# Patient Record
Sex: FEMALE | Race: WHITE | NOT HISPANIC OR LATINO | Employment: UNEMPLOYED | URBAN - METROPOLITAN AREA
[De-identification: names, ages, dates, MRNs, and addresses within clinical notes are randomized per-mention and may not be internally consistent; named-entity substitution may affect disease eponyms.]

---

## 2019-07-16 PROBLEM — I25.10 CORONARY ARTERY DISEASE: Status: ACTIVE | Noted: 2019-07-16

## 2019-07-16 PROBLEM — Z72.0 TOBACCO USER: Status: ACTIVE | Noted: 2019-07-16

## 2021-07-13 ENCOUNTER — SPECIALTY PHARMACY (OUTPATIENT)
Dept: PHARMACY | Facility: CLINIC | Age: 61
End: 2021-07-13

## 2021-07-14 ENCOUNTER — SPECIALTY PHARMACY (OUTPATIENT)
Dept: PHARMACY | Facility: CLINIC | Age: 61
End: 2021-07-14

## 2021-07-14 DIAGNOSIS — M05.9 RHEUMATOID ARTHRITIS WITH POSITIVE RHEUMATOID FACTOR, INVOLVING UNSPECIFIED SITE: Primary | ICD-10-CM

## 2021-08-04 ENCOUNTER — TELEPHONE (OUTPATIENT)
Dept: PHARMACY | Facility: CLINIC | Age: 61
End: 2021-08-04

## 2021-08-09 ENCOUNTER — PATIENT MESSAGE (OUTPATIENT)
Dept: PHARMACY | Facility: CLINIC | Age: 61
End: 2021-08-09

## 2021-08-10 ENCOUNTER — SPECIALTY PHARMACY (OUTPATIENT)
Dept: PHARMACY | Facility: CLINIC | Age: 61
End: 2021-08-10

## 2021-09-14 ENCOUNTER — SPECIALTY PHARMACY (OUTPATIENT)
Dept: PHARMACY | Facility: CLINIC | Age: 61
End: 2021-09-14

## 2021-10-20 ENCOUNTER — SPECIALTY PHARMACY (OUTPATIENT)
Dept: PHARMACY | Facility: CLINIC | Age: 61
End: 2021-10-20

## 2021-10-22 ENCOUNTER — SPECIALTY PHARMACY (OUTPATIENT)
Dept: PHARMACY | Facility: CLINIC | Age: 61
End: 2021-10-22

## 2021-10-22 DIAGNOSIS — M05.9 RHEUMATOID ARTHRITIS WITH POSITIVE RHEUMATOID FACTOR, INVOLVING UNSPECIFIED SITE: Primary | ICD-10-CM

## 2021-11-15 ENCOUNTER — SPECIALTY PHARMACY (OUTPATIENT)
Dept: PHARMACY | Facility: CLINIC | Age: 61
End: 2021-11-15

## 2021-12-13 ENCOUNTER — SPECIALTY PHARMACY (OUTPATIENT)
Dept: PHARMACY | Facility: CLINIC | Age: 61
End: 2021-12-13

## 2022-01-12 ENCOUNTER — SPECIALTY PHARMACY (OUTPATIENT)
Dept: PHARMACY | Facility: CLINIC | Age: 62
End: 2022-01-12

## 2022-01-12 NOTE — TELEPHONE ENCOUNTER
Specialty Pharmacy - Refill Coordination    Specialty Medication Orders Linked to Encounter    Flowsheet Row Most Recent Value   Medication #1 tocilizumab (ACTEMRA) 162 mg/0.9 mL injection (Order#[id number], Rx#[id number])          Refill Questions - Documented Responses    Flowsheet Row Most Recent Value   Patient Availability and HIPAA Verification    Does patient want to proceed with activity? Yes   HIPAA/medical authority confirmed? Yes   Relationship to patient of person spoken to? Self   Refill Screening Questions    Changes to allergies? No   Changes to medications? No   New conditions since last clinic visit? No   Unplanned office visit, urgent care, ED, or hospital admission in the last 4 weeks? No   How does patient/caregiver feel medication is working? Good   Financial problems or insurance changes? No   How many doses of your specialty medications were missed in the last 4 weeks? 0   Would patient like to speak to a pharmacist? No   When does the patient need to receive the medication? 01/19/22   Refill Delivery Questions    How will the patient receive the medication? Mail   When does the patient need to receive the medication? 01/19/22   Shipping Address Home   Address in [facility] confirmed and updated if neccessary? Yes   Expected Copay ($) 0   Is the patient able to afford the medication copay? Yes   Payment Method zero copay   Days supply of Refill 28   Supplies needed? No supplies needed   Refill activity completed? Yes   Refill activity plan Refill scheduled   Shipment/Pickup Date: 01/18/22          Current Outpatient Medications   Medication Sig    albuterol (PROAIR HFA) 90 mcg/actuation inhaler as needed.    albuterol-ipratropium (DUO-NEB) 2.5 mg-0.5 mg/3 mL nebulizer solution ipratropium 0.5 mg-albuterol 3 mg (2.5 mg base)/3 mL nebulization soln   USE 1 AMPULE IN NEBULIZER EVERY 8 TO 12 HOURS AS NEEDED FOR COUGH    alendronate (FOSAMAX) 70 MG tablet Take 1 tablet (70 mg total) by  mouth every 7 days.    aspirin 81 MG Chew aspirin 81 mg chewable tablet   Chew 1 tablet every day by oral route for 30 days.    atorvastatin (LIPITOR) 40 MG tablet Take 40 mg by mouth once daily.    calcium-vitamin D 600 mg(1,500mg) -400 unit Tab Take by mouth once daily.    EPINEPHrine (EPIPEN) 0.3 mg/0.3 mL AtIn epinephrine 0.3 mg/0.3 mL injection, auto-injector   use AS DIRECTED    folic acid (FOLVITE) 1 MG tablet Take 1 tablet (1 mg total) by mouth once daily.    ibuprofen (ADVIL,MOTRIN) 800 MG tablet ibuprofen 800 mg tablet   TAKE ONE TABLET BY MOUTH DAILY AS NEEDED FOR PAIN    metoprolol tartrate (LOPRESSOR) 25 MG tablet Take 12.5 mg by mouth 2 (two) times daily.    multivitamin capsule Take 1 capsule by mouth once daily.    pantoprazole (PROTONIX) 40 MG tablet Take 1 tablet (40 mg total) by mouth once daily.    predniSONE (DELTASONE) 5 MG tablet Take 1 tablet (5 mg total) by mouth once daily.    tocilizumab (ACTEMRA) 162 mg/0.9 mL injection Inject 0.9 mLs (162 mg total) into the skin every 14 (fourteen) days.   Last reviewed on 10/20/2021 10:58 AM by Christiane Wyman MD    Review of patient's allergies indicates:   Allergen Reactions    Povidone-iodine Rash and Swelling    Venom-wasp Anaphylaxis    Last reviewed on  10/20/2021 10:58 AM by Christiane Wyman      Tasks added this encounter   No tasks added.   Tasks due within next 3 months   1/13/2022 - Clinical - Follow Up Assesement (90 day)  1/12/2022 - Refill Call (Auto Added)     Kulwant Barnett  [facility] - Specialty Pharmacy  [address] [place] [address]  Phone: [phone]  Fax: [phone]

## 2022-02-07 ENCOUNTER — SPECIALTY PHARMACY (OUTPATIENT)
Dept: PHARMACY | Facility: CLINIC | Age: 62
End: 2022-02-07

## 2022-02-07 NOTE — TELEPHONE ENCOUNTER
Specialty Pharmacy - Refill Coordination    Specialty Medication Orders Linked to Encounter    Flowsheet Row Most Recent Value   Medication #1 tocilizumab (ACTEMRA) 162 mg/0.9 mL injection (Order#[id number], Rx#[id number])          Refill Questions - Documented Responses    Flowsheet Row Most Recent Value   Patient Availability and HIPAA Verification    Does patient want to proceed with activity? Yes   HIPAA/medical authority confirmed? Yes   Relationship to patient of person spoken to? Self   Refill Screening Questions    Changes to allergies? No   Changes to medications? No   New conditions since last clinic visit? No   Unplanned office visit, urgent care, ED, or hospital admission in the last 4 weeks? No   How does patient/caregiver feel medication is working? Good   Financial problems or insurance changes? No   How many doses of your specialty medications were missed in the last 4 weeks? 0   Would patient like to speak to a pharmacist? No   When does the patient need to receive the medication? 02/16/22   Refill Delivery Questions    How will the patient receive the medication? Mail   When does the patient need to receive the medication? 02/16/22   Shipping Address Home   Address in [facility] confirmed and updated if neccessary? Yes   Expected Copay ($) 0   Is the patient able to afford the medication copay? Yes   Payment Method zero copay   Days supply of Refill 28   Supplies needed? No supplies needed   Refill activity completed? Yes   Refill activity plan Refill scheduled   Shipment/Pickup Date: 02/14/22          Current Outpatient Medications   Medication Sig    albuterol (PROAIR HFA) 90 mcg/actuation inhaler as needed.    albuterol-ipratropium (DUO-NEB) 2.5 mg-0.5 mg/3 mL nebulizer solution ipratropium 0.5 mg-albuterol 3 mg (2.5 mg base)/3 mL nebulization soln   USE 1 AMPULE IN NEBULIZER EVERY 8 TO 12 HOURS AS NEEDED FOR COUGH    alendronate (FOSAMAX) 70 MG tablet Take 1 tablet (70 mg total) by  mouth every 7 days.    aspirin 81 MG Chew aspirin 81 mg chewable tablet   Chew 1 tablet every day by oral route for 30 days.    atorvastatin (LIPITOR) 40 MG tablet Take 40 mg by mouth once daily.    calcium-vitamin D 600 mg(1,500mg) -400 unit Tab Take by mouth once daily.    EPINEPHrine (EPIPEN) 0.3 mg/0.3 mL AtIn epinephrine 0.3 mg/0.3 mL injection, auto-injector   use AS DIRECTED    folic acid (FOLVITE) 1 MG tablet Take 1 tablet (1 mg total) by mouth once daily.    ibuprofen (ADVIL,MOTRIN) 800 MG tablet ibuprofen 800 mg tablet   TAKE ONE TABLET BY MOUTH DAILY AS NEEDED FOR PAIN    metoprolol tartrate (LOPRESSOR) 25 MG tablet Take 12.5 mg by mouth 2 (two) times daily.    multivitamin capsule Take 1 capsule by mouth once daily.    pantoprazole (PROTONIX) 40 MG tablet Take 1 tablet (40 mg total) by mouth once daily.    predniSONE (DELTASONE) 5 MG tablet Take 1 tablet (5 mg total) by mouth once daily.    tocilizumab (ACTEMRA) 162 mg/0.9 mL injection Inject 0.9 mLs (162 mg total) into the skin every 14 (fourteen) days.   Last reviewed on 10/20/2021 10:58 AM by Christiane Wyman MD    Review of patient's allergies indicates:   Allergen Reactions    Povidone-iodine Rash and Swelling    Venom-wasp Anaphylaxis    Last reviewed on  2/7/2022 2:27 PM by Christiane Wyman      Tasks added this encounter   3/9/2022 - Refill Call (Auto Added)   Tasks due within next 3 months   No tasks due.     Kalee Enamorado  [facility] - Specialty Pharmacy  [address] [facility] [address]  Phone: [phone]  Fax: [phone]

## 2022-02-28 ENCOUNTER — SPECIALTY PHARMACY (OUTPATIENT)
Dept: PHARMACY | Facility: CLINIC | Age: 62
End: 2022-02-28

## 2022-02-28 NOTE — TELEPHONE ENCOUNTER
Patient called to schedule her Actemra refill. Patient has one dose left on hand for Wednesday and will not need a refill until 3/16. Patient wanted to get the dose early. RTS until 3/1/22. Scheduling refill call for 3/2 per patient's request.

## 2022-03-03 ENCOUNTER — SPECIALTY PHARMACY (OUTPATIENT)
Dept: PHARMACY | Facility: CLINIC | Age: 62
End: 2022-03-03

## 2022-03-09 NOTE — TELEPHONE ENCOUNTER
Specialty Pharmacy - Refill Coordination    Specialty Medication Orders Linked to Encounter    Flowsheet Row Most Recent Value   Medication #1 tocilizumab (ACTEMRA) 162 mg/0.9 mL injection (Order#[id number], Rx#[id number])          Refill Questions - Documented Responses    Flowsheet Row Most Recent Value   Patient Availability and HIPAA Verification    Does patient want to proceed with activity? Yes   HIPAA/medical authority confirmed? Yes   Relationship to patient of person spoken to? Self   Refill Screening Questions    Changes to allergies? No   Changes to medications? No   New conditions since last clinic visit? No   Unplanned office visit, urgent care, ED, or hospital admission in the last 4 weeks? No   How does patient/caregiver feel medication is working? Good   Financial problems or insurance changes? No   How many doses of your specialty medications were missed in the last 4 weeks? 0   Would patient like to speak to a pharmacist? No   When does the patient need to receive the medication? 03/15/22   Refill Delivery Questions    How will the patient receive the medication? Mail   When does the patient need to receive the medication? 03/15/22   Shipping Address Home   Address in [facility] confirmed and updated if neccessary? Yes   Expected Copay ($) 0   Is the patient able to afford the medication copay? Yes   Payment Method zero copay   Days supply of Refill 28   Supplies needed? No supplies needed   Refill activity completed? Yes   Refill activity plan Refill scheduled   Shipment/Pickup Date: 03/09/22          Current Outpatient Medications   Medication Sig    albuterol (PROAIR HFA) 90 mcg/actuation inhaler as needed.    albuterol-ipratropium (DUO-NEB) 2.5 mg-0.5 mg/3 mL nebulizer solution ipratropium 0.5 mg-albuterol 3 mg (2.5 mg base)/3 mL nebulization soln   USE 1 AMPULE IN NEBULIZER EVERY 8 TO 12 HOURS AS NEEDED FOR COUGH    alendronate (FOSAMAX) 70 MG tablet Take 1 tablet (70 mg total) by  mouth every 7 days.    aspirin 81 MG Chew aspirin 81 mg chewable tablet   Chew 1 tablet every day by oral route for 30 days.    atorvastatin (LIPITOR) 40 MG tablet Take 40 mg by mouth once daily.    calcium-vitamin D 600 mg(1,500mg) -400 unit Tab Take by mouth once daily.    EPINEPHrine (EPIPEN) 0.3 mg/0.3 mL AtIn epinephrine 0.3 mg/0.3 mL injection, auto-injector   use AS DIRECTED    folic acid (FOLVITE) 1 MG tablet Take 1 tablet (1 mg total) by mouth once daily.    ibuprofen (ADVIL,MOTRIN) 800 MG tablet ibuprofen 800 mg tablet   TAKE ONE TABLET BY MOUTH DAILY AS NEEDED FOR PAIN    metoprolol tartrate (LOPRESSOR) 25 MG tablet Take 12.5 mg by mouth 2 (two) times daily.    multivitamin capsule Take 1 capsule by mouth once daily.    pantoprazole (PROTONIX) 40 MG tablet Take 1 tablet (40 mg total) by mouth once daily.    predniSONE (DELTASONE) 5 MG tablet Take 1 tablet (5 mg total) by mouth once daily.    tocilizumab (ACTEMRA) 162 mg/0.9 mL injection Inject 0.9 mLs (162 mg total) into the skin every 14 (fourteen) days.   Last reviewed on 10/20/2021 10:58 AM by Christiane Wyman MD    Review of patient's allergies indicates:   Allergen Reactions    Povidone-iodine Rash and Swelling    Venom-wasp Anaphylaxis    Last reviewed on  2/7/2022 2:27 PM by Christiane Wyman      Tasks added this encounter   4/5/2022 - Refill Call (Auto Added)   Tasks due within next 3 months   No tasks due.     Gisele Perez, PharmD  [facility] - Specialty Pharmacy  [address] [place] [address]  Phone: [phone]  Fax: [phone]

## 2022-03-31 ENCOUNTER — SPECIALTY PHARMACY (OUTPATIENT)
Dept: PHARMACY | Facility: CLINIC | Age: 62
End: 2022-03-31

## 2022-03-31 NOTE — TELEPHONE ENCOUNTER
Spoke with patient. She stated she injected 3/30 and her next injection is 4/13. She is going out of town and would like to receive her ACTEMRA refill ASAP.

## 2022-04-02 NOTE — TELEPHONE ENCOUNTER
Specialty Pharmacy - Refill Coordination    Specialty Medication Orders Linked to Encounter    Flowsheet Row Most Recent Value   Medication #1 tocilizumab (ACTEMRA) 162 mg/0.9 mL injection (Order#[id number], Rx#[id number])          Refill Questions - Documented Responses    Flowsheet Row Most Recent Value   Patient Availability and HIPAA Verification    Does patient want to proceed with activity? Yes   HIPAA/medical authority confirmed? Yes   Relationship to patient of person spoken to? Self   Refill Screening Questions    Changes to allergies? No   Changes to medications? No   New conditions since last clinic visit? No   Unplanned office visit, urgent care, ED, or hospital admission in the last 4 weeks? No   How does patient/caregiver feel medication is working? Good   Financial problems or insurance changes? No   How many doses of your specialty medications were missed in the last 4 weeks? 0   Would patient like to speak to a pharmacist? No   When does the patient need to receive the medication? 04/12/22   Refill Delivery Questions    How will the patient receive the medication? Mail   When does the patient need to receive the medication? 04/12/22   Shipping Address Home   Address in [facility] confirmed and updated if neccessary? Yes   Expected Copay ($) 0   Is the patient able to afford the medication copay? Yes   Payment Method zero copay   Days supply of Refill 28   Supplies needed? No supplies needed   Refill activity completed? Yes   Refill activity plan Refill scheduled   Shipment/Pickup Date: 04/04/22          Current Outpatient Medications   Medication Sig    albuterol (PROVENTIL/VENTOLIN HFA) 90 mcg/actuation inhaler as needed.    albuterol-ipratropium (DUO-NEB) 2.5 mg-0.5 mg/3 mL nebulizer solution ipratropium 0.5 mg-albuterol 3 mg (2.5 mg base)/3 mL nebulization soln   USE 1 AMPULE IN NEBULIZER EVERY 8 TO 12 HOURS AS NEEDED FOR COUGH    alendronate (FOSAMAX) 70 MG tablet Take 1 tablet (70 mg  total) by mouth every 7 days.    aspirin 81 MG Chew aspirin 81 mg chewable tablet   Chew 1 tablet every day by oral route for 30 days.    atorvastatin (LIPITOR) 40 MG tablet Take 40 mg by mouth once daily.    calcium-vitamin D 600 mg(1,500mg) -400 unit Tab Take by mouth once daily.    EPINEPHrine (EPIPEN) 0.3 mg/0.3 mL AtIn epinephrine 0.3 mg/0.3 mL injection, auto-injector   use AS DIRECTED    folic acid (FOLVITE) 1 MG tablet Take 1 tablet (1 mg total) by mouth once daily.    ibuprofen (ADVIL,MOTRIN) 800 MG tablet ibuprofen 800 mg tablet   TAKE ONE TABLET BY MOUTH DAILY AS NEEDED FOR PAIN    metoprolol tartrate (LOPRESSOR) 25 MG tablet Take 12.5 mg by mouth 2 (two) times daily.    multivitamin capsule Take 1 capsule by mouth once daily.    pantoprazole (PROTONIX) 40 MG tablet Take 1 tablet (40 mg total) by mouth once daily.    predniSONE (DELTASONE) 5 MG tablet Take 1 tablet (5 mg total) by mouth once daily.    tocilizumab (ACTEMRA) 162 mg/0.9 mL injection Inject 0.9 mLs (162 mg total) into the skin every 14 (fourteen) days.   Last reviewed on 3/16/2022  9:14 AM by Lula Cornell MA    Review of patient's allergies indicates:   Allergen Reactions    Povidone-iodine Rash and Swelling    Venom-wasp Anaphylaxis    Last reviewed on  3/16/2022 9:14 AM by Lula Cornell      Tasks added this encounter   No tasks added.   Tasks due within next 3 months   3/31/2022 - Refill Call (Auto Added)     Kalee Dyer [facility] - Specialty Pharmacy  [address] [facility] [address]  Phone: [phone]  Fax: [phone]

## 2022-04-18 ENCOUNTER — PATIENT MESSAGE (OUTPATIENT)
Dept: ADMINISTRATIVE | Facility: OTHER | Age: 62
End: 2022-04-18

## 2022-05-04 ENCOUNTER — SPECIALTY PHARMACY (OUTPATIENT)
Dept: PHARMACY | Facility: CLINIC | Age: 62
End: 2022-05-04

## 2022-05-09 NOTE — TELEPHONE ENCOUNTER
Actemra PA submitted via [facility] Key: [id number] - PA Case ID: [id number]

## 2022-05-09 NOTE — TELEPHONE ENCOUNTER
Call from patient to check status of Actemra PA. Explained that medicaid had not yet reached a determination, advised patient that [facility] would follow up tomorrow.     Mekhi Grace, PharmD  Clinical Pharmacist  [facility]  P: [phone]

## 2022-05-10 RX ORDER — AZITHROMYCIN 250 MG/1
TABLET, FILM COATED ORAL
COMMUNITY
Start: 2022-04-27 | End: 2023-07-25

## 2022-05-10 NOTE — TELEPHONE ENCOUNTER
PA for Actemra APPROVED; $0 copay, BI/FA not required. Calling to set up refill.     Mekhi Grace, PharmD  Clinical Pharmacist  [facility]  P: [phone]

## 2022-05-10 NOTE — TELEPHONE ENCOUNTER
Specialty Pharmacy - Refill Coordination  Specialty Pharmacy - Medication/Referral Authorization    Specialty Medication Orders Linked to Encounter    Flowsheet Row Most Recent Value   Medication #1 tocilizumab (ACTEMRA) 162 mg/0.9 mL injection (Order#[id number], Rx#[id number])          Refill Questions - Documented Responses    Flowsheet Row Most Recent Value   Patient Availability and HIPAA Verification    Does patient want to proceed with activity? Yes   HIPAA/medical authority confirmed? Yes   Relationship to patient of person spoken to? Self   Refill Screening Questions    Changes to allergies? No   Changes to medications? No   New conditions since last clinic visit? No   Unplanned office visit, urgent care, ED, or hospital admission in the last 4 weeks? No   How does patient/caregiver feel medication is working? Excellent   Financial problems or insurance changes? No   How many doses of your specialty medications were missed in the last 4 weeks? 0   Would patient like to speak to a pharmacist? No   When does the patient need to receive the medication? 05/11/22   Refill Delivery Questions    How will the patient receive the medication? Mail   When does the patient need to receive the medication? 05/11/22   Shipping Address Home   Address in [facility] confirmed and updated if neccessary? Yes   Expected Copay ($) 0   Is the patient able to afford the medication copay? Yes   Payment Method zero copay   Days supply of Refill 28   Supplies needed? No supplies needed   Refill activity completed? Yes   Refill activity plan Refill scheduled   Shipment/Pickup Date: 05/10/22          Current Outpatient Medications   Medication Sig    albuterol (PROVENTIL/VENTOLIN HFA) 90 mcg/actuation inhaler as needed.    albuterol-ipratropium (DUO-NEB) 2.5 mg-0.5 mg/3 mL nebulizer solution ipratropium 0.5 mg-albuterol 3 mg (2.5 mg base)/3 mL nebulization soln   USE 1 AMPULE IN NEBULIZER EVERY 8 TO 12 HOURS AS NEEDED FOR COUGH     alendronate (FOSAMAX) 70 MG tablet Take 1 tablet (70 mg total) by mouth every 7 days.    aspirin 81 MG Chew aspirin 81 mg chewable tablet   Chew 1 tablet every day by oral route for 30 days.    atorvastatin (LIPITOR) 40 MG tablet Take 40 mg by mouth once daily.    azithromycin (Z-ERIC) 250 MG tablet Take by mouth.    calcium-vitamin D 600 mg(1,500mg) -400 unit Tab Take by mouth once daily.    EPINEPHrine (EPIPEN) 0.3 mg/0.3 mL AtIn epinephrine 0.3 mg/0.3 mL injection, auto-injector   use AS DIRECTED    folic acid (FOLVITE) 1 MG tablet Take 1 tablet (1 mg total) by mouth once daily.    ibuprofen (ADVIL,MOTRIN) 800 MG tablet ibuprofen 800 mg tablet   TAKE ONE TABLET BY MOUTH DAILY AS NEEDED FOR PAIN    metoprolol tartrate (LOPRESSOR) 25 MG tablet Take 12.5 mg by mouth 2 (two) times daily.    multivitamin capsule Take 1 capsule by mouth once daily.    pantoprazole (PROTONIX) 40 MG tablet Take 1 tablet (40 mg total) by mouth once daily.    predniSONE (DELTASONE) 5 MG tablet Take 1 tablet (5 mg total) by mouth once daily.    tocilizumab (ACTEMRA) 162 mg/0.9 mL injection Inject 0.9 mLs (162 mg total) into the skin every 14 (fourteen) days.   Last reviewed on 3/16/2022  9:14 AM by Lula Cornell MA    Review of patient's allergies indicates:   Allergen Reactions    Povidone-iodine Rash and Swelling    Venom-wasp Anaphylaxis    Last reviewed on  3/16/2022 9:14 AM by Lula Cornell      Tasks added this encounter   6/1/2022 - Refill Call (Auto Added)   Tasks due within next 3 months   No tasks due.     Mekhi Grace, PharmD  [facility] - Specialty Pharmacy  [address] [place] [address]  Phone: [phone]  Fax: [phone]

## 2022-06-06 ENCOUNTER — SPECIALTY PHARMACY (OUTPATIENT)
Dept: PHARMACY | Facility: CLINIC | Age: 62
End: 2022-06-06

## 2022-06-06 NOTE — TELEPHONE ENCOUNTER
Specialty Pharmacy - Refill Coordination    Specialty Medication Orders Linked to Encounter    Flowsheet Row Most Recent Value   Medication #1 tocilizumab (ACTEMRA) 162 mg/0.9 mL injection (Order#[id number], Rx#[id number])          Refill Questions - Documented Responses    Flowsheet Row Most Recent Value   Patient Availability and HIPAA Verification    Does patient want to proceed with activity? Yes   HIPAA/medical authority confirmed? Yes   Relationship to patient of person spoken to? Self   Refill Screening Questions    Changes to allergies? No   Changes to medications? No   New conditions since last clinic visit? No   Unplanned office visit, urgent care, ED, or hospital admission in the last 4 weeks? No   How does patient/caregiver feel medication is working? Good   Financial problems or insurance changes? No   How many doses of your specialty medications were missed in the last 4 weeks? 0   Would patient like to speak to a pharmacist? No   When does the patient need to receive the medication? 06/08/22   Refill Delivery Questions    How will the patient receive the medication? Mail   When does the patient need to receive the medication? 06/08/22   Shipping Address Home   Address in [facility] confirmed and updated if neccessary? Yes   Expected Copay ($) 0   Is the patient able to afford the medication copay? Yes   Payment Method zero copay   Days supply of Refill 28   Supplies needed? No supplies needed   Refill activity completed? Yes   Refill activity plan Refill scheduled   Shipment/Pickup Date: 06/06/22          Current Outpatient Medications   Medication Sig    albuterol (PROVENTIL/VENTOLIN HFA) 90 mcg/actuation inhaler as needed.    albuterol-ipratropium (DUO-NEB) 2.5 mg-0.5 mg/3 mL nebulizer solution ipratropium 0.5 mg-albuterol 3 mg (2.5 mg base)/3 mL nebulization soln   USE 1 AMPULE IN NEBULIZER EVERY 8 TO 12 HOURS AS NEEDED FOR COUGH    alendronate (FOSAMAX) 70 MG tablet Take 1 tablet (70 mg  total) by mouth every 7 days.    aspirin 81 MG Chew aspirin 81 mg chewable tablet   Chew 1 tablet every day by oral route for 30 days.    atorvastatin (LIPITOR) 40 MG tablet Take 40 mg by mouth once daily.    azithromycin (Z-ERIC) 250 MG tablet Take by mouth.    calcium-vitamin D 600 mg(1,500mg) -400 unit Tab Take by mouth once daily.    EPINEPHrine (EPIPEN) 0.3 mg/0.3 mL AtIn epinephrine 0.3 mg/0.3 mL injection, auto-injector   use AS DIRECTED    folic acid (FOLVITE) 1 MG tablet Take 1 tablet (1 mg total) by mouth once daily.    ibuprofen (ADVIL,MOTRIN) 800 MG tablet ibuprofen 800 mg tablet   TAKE ONE TABLET BY MOUTH DAILY AS NEEDED FOR PAIN    metoprolol tartrate (LOPRESSOR) 25 MG tablet Take 12.5 mg by mouth 2 (two) times daily.    multivitamin capsule Take 1 capsule by mouth once daily.    pantoprazole (PROTONIX) 40 MG tablet Take 1 tablet (40 mg total) by mouth once daily.    predniSONE (DELTASONE) 5 MG tablet Take 1 tablet (5 mg total) by mouth once daily.    tocilizumab (ACTEMRA) 162 mg/0.9 mL injection Inject 0.9 mLs (162 mg total) into the skin every 14 (fourteen) days.   Last reviewed on 3/16/2022  9:14 AM by Lula Cornell MA    Review of patient's allergies indicates:   Allergen Reactions    Povidone-iodine Rash and Swelling    Venom-wasp Anaphylaxis    Last reviewed on  3/16/2022 9:14 AM by Lula Cornell      Tasks added this encounter   No tasks added.   Tasks due within next 3 months   6/3/2022 - Refill Call (Auto Added)     Kulwant Barnett  [facility] - Specialty Pharmacy  [address] [place] [address]  Phone: [phone]  Fax: [phone]

## 2022-06-29 ENCOUNTER — SPECIALTY PHARMACY (OUTPATIENT)
Dept: PHARMACY | Facility: CLINIC | Age: 62
End: 2022-06-29

## 2022-06-29 NOTE — TELEPHONE ENCOUNTER
Incoming call regarding Actemra refill. Pt stated she is due to inject on 7/5/22. Informed pt her insurance will not go threw till 6/30/22. Pt stated she is going out of town and asked for me to rerun the script on 6/30/22 and set up her refill to go out for 7/5/22 so she will receive it on 7/6/22. I asked her the refill questions and will follow up on the rest on 6/30/22.

## 2022-06-30 ENCOUNTER — PATIENT MESSAGE (OUTPATIENT)
Dept: PHARMACY | Facility: CLINIC | Age: 62
End: 2022-06-30

## 2022-07-05 NOTE — TELEPHONE ENCOUNTER
Specialty Pharmacy - Refill Coordination    Specialty Medication Orders Linked to Encounter    Flowsheet Row Most Recent Value   Medication #1 tocilizumab (ACTEMRA) 162 mg/0.9 mL injection (Order#[id number], Rx#[id number])          Refill Questions - Documented Responses    Flowsheet Row Most Recent Value   Patient Availability and HIPAA Verification    Does patient want to proceed with activity? Yes   HIPAA/medical authority confirmed? Yes   Relationship to patient of person spoken to? Self   Refill Screening Questions    Changes to allergies? No   Changes to medications? No   New conditions since last clinic visit? No   Unplanned office visit, urgent care, ED, or hospital admission in the last 4 weeks? No   How does patient/caregiver feel medication is working? Good   Financial problems or insurance changes? No   How many doses of your specialty medications were missed in the last 4 weeks? 0   Would patient like to speak to a pharmacist? No   When does the patient need to receive the medication? 07/08/22   Refill Delivery Questions    How will the patient receive the medication? Mail   When does the patient need to receive the medication? 07/08/22   Shipping Address Home   Address in [facility] confirmed and updated if neccessary? Yes   Expected Copay ($) 0   Is the patient able to afford the medication copay? Yes   Payment Method zero copay   Days supply of Refill 28   Supplies needed? No supplies needed   Refill activity completed? Yes   Refill activity plan Refill scheduled   Shipment/Pickup Date: 07/05/22          Current Outpatient Medications   Medication Sig    albuterol (PROVENTIL/VENTOLIN HFA) 90 mcg/actuation inhaler as needed.    albuterol-ipratropium (DUO-NEB) 2.5 mg-0.5 mg/3 mL nebulizer solution ipratropium 0.5 mg-albuterol 3 mg (2.5 mg base)/3 mL nebulization soln   USE 1 AMPULE IN NEBULIZER EVERY 8 TO 12 HOURS AS NEEDED FOR COUGH    alendronate (FOSAMAX) 70 MG tablet Take 1 tablet (70 mg  total) by mouth every 7 days.    aspirin 81 MG Chew aspirin 81 mg chewable tablet   Chew 1 tablet every day by oral route for 30 days.    atorvastatin (LIPITOR) 40 MG tablet Take 40 mg by mouth once daily.    azithromycin (Z-ERIC) 250 MG tablet Take by mouth.    calcium-vitamin D 600 mg(1,500mg) -400 unit Tab Take by mouth once daily.    EPINEPHrine (EPIPEN) 0.3 mg/0.3 mL AtIn epinephrine 0.3 mg/0.3 mL injection, auto-injector   use AS DIRECTED    folic acid (FOLVITE) 1 MG tablet Take 1 tablet (1 mg total) by mouth once daily.    ibuprofen (ADVIL,MOTRIN) 800 MG tablet ibuprofen 800 mg tablet   TAKE ONE TABLET BY MOUTH DAILY AS NEEDED FOR PAIN    metoprolol tartrate (LOPRESSOR) 25 MG tablet Take 12.5 mg by mouth 2 (two) times daily.    multivitamin capsule Take 1 capsule by mouth once daily.    pantoprazole (PROTONIX) 40 MG tablet Take 1 tablet (40 mg total) by mouth once daily.    predniSONE (DELTASONE) 5 MG tablet Take 1 tablet (5 mg total) by mouth once daily.    tocilizumab (ACTEMRA) 162 mg/0.9 mL injection Inject 0.9 mLs (162 mg total) into the skin every 14 (fourteen) days.   Last reviewed on 3/16/2022  9:14 AM by Lula Cornell MA    Review of patient's allergies indicates:   Allergen Reactions    Povidone-iodine Rash and Swelling    Venom-wasp Anaphylaxis    Last reviewed on  3/16/2022 9:14 AM by Lula Cornell      Tasks added this encounter   7/29/2022 - Refill Call (Auto Added)   Tasks due within next 3 months   No tasks due.     Whit Ramirez  [facility] - Specialty Pharmacy  [address] [place] [address]  Phone: [phone]  Fax: [phone]

## 2022-07-29 ENCOUNTER — SPECIALTY PHARMACY (OUTPATIENT)
Dept: PHARMACY | Facility: CLINIC | Age: 62
End: 2022-07-29

## 2022-07-29 ENCOUNTER — PATIENT MESSAGE (OUTPATIENT)
Dept: PHARMACY | Facility: CLINIC | Age: 62
End: 2022-07-29

## 2022-07-29 NOTE — TELEPHONE ENCOUNTER
Specialty Pharmacy - Refill Coordination    Specialty Medication Orders Linked to Encounter    Flowsheet Row Most Recent Value   Medication #1 tocilizumab (ACTEMRA) 162 mg/0.9 mL injection (Order#[id number], Rx#[id number])          Refill Questions - Documented Responses    Flowsheet Row Most Recent Value   Patient Availability and HIPAA Verification    Does patient want to proceed with activity? Yes   HIPAA/medical authority confirmed? Yes   Relationship to patient of person spoken to? Self   Refill Screening Questions    Changes to allergies? No   Changes to medications? No   New conditions since last clinic visit? No   Unplanned office visit, urgent care, ED, or hospital admission in the last 4 weeks? No   How does patient/caregiver feel medication is working? Good   Financial problems or insurance changes? No   How many doses of your specialty medications were missed in the last 4 weeks? 0   Would patient like to speak to a pharmacist? No   When does the patient need to receive the medication? 08/03/22   Refill Delivery Questions    How will the patient receive the medication? Mail   When does the patient need to receive the medication? 08/03/22   Shipping Address Home   Address in [facility] confirmed and updated if neccessary? Yes   Expected Copay ($) 0   Is the patient able to afford the medication copay? Yes   Payment Method zero copay   Days supply of Refill 28   Supplies needed? No supplies needed   Refill activity completed? Yes   Refill activity plan Refill scheduled   Shipment/Pickup Date: 08/01/22          Current Outpatient Medications   Medication Sig    albuterol (PROVENTIL/VENTOLIN HFA) 90 mcg/actuation inhaler as needed.    albuterol-ipratropium (DUO-NEB) 2.5 mg-0.5 mg/3 mL nebulizer solution ipratropium 0.5 mg-albuterol 3 mg (2.5 mg base)/3 mL nebulization soln   USE 1 AMPULE IN NEBULIZER EVERY 8 TO 12 HOURS AS NEEDED FOR COUGH    alendronate (FOSAMAX) 70 MG tablet Take 1 tablet (70 mg  total) by mouth every 7 days.    aspirin 81 MG Chew aspirin 81 mg chewable tablet   Chew 1 tablet every day by oral route for 30 days.    atorvastatin (LIPITOR) 40 MG tablet Take 40 mg by mouth once daily.    azithromycin (Z-ERIC) 250 MG tablet Take by mouth.    calcium-vitamin D 600 mg(1,500mg) -400 unit Tab Take by mouth once daily.    EPINEPHrine (EPIPEN) 0.3 mg/0.3 mL AtIn epinephrine 0.3 mg/0.3 mL injection, auto-injector   use AS DIRECTED    folic acid (FOLVITE) 1 MG tablet Take 1 tablet (1 mg total) by mouth once daily.    ibuprofen (ADVIL,MOTRIN) 800 MG tablet ibuprofen 800 mg tablet   TAKE ONE TABLET BY MOUTH DAILY AS NEEDED FOR PAIN    metoprolol tartrate (LOPRESSOR) 25 MG tablet Take 12.5 mg by mouth 2 (two) times daily.    multivitamin capsule Take 1 capsule by mouth once daily.    pantoprazole (PROTONIX) 40 MG tablet Take 1 tablet (40 mg total) by mouth once daily.    predniSONE (DELTASONE) 5 MG tablet Take 1 tablet (5 mg total) by mouth once daily.    tocilizumab (ACTEMRA) 162 mg/0.9 mL injection Inject 0.9 mLs (162 mg total) into the skin every 14 (fourteen) days.   Last reviewed on 3/16/2022  9:14 AM by Lula Cornell MA    Review of patient's allergies indicates:   Allergen Reactions    Povidone-iodine Rash and Swelling    Venom-wasp Anaphylaxis    Last reviewed on  3/16/2022 9:14 AM by Lula Cornell      Tasks added this encounter   No tasks added.   Tasks due within next 3 months   7/29/2022 - Refill Call (Auto Added)     Kulwant Barnett  [facility] - Specialty Pharmacy  [address] [place] [address]  Phone: [phone]  Fax: [phone]

## 2022-08-24 ENCOUNTER — SPECIALTY PHARMACY (OUTPATIENT)
Dept: PHARMACY | Facility: CLINIC | Age: 62
End: 2022-08-24

## 2022-08-24 DIAGNOSIS — M05.9 RHEUMATOID ARTHRITIS WITH POSITIVE RHEUMATOID FACTOR, INVOLVING UNSPECIFIED SITE: Primary | ICD-10-CM

## 2022-08-24 NOTE — TELEPHONE ENCOUNTER
Specialty Pharmacy - Clinical Reassessment    Specialty Medication Orders Linked to Encounter    Flowsheet Row Most Recent Value   Medication #1 tocilizumab (ACTEMRA) 162 mg/0.9 mL injection (Order#[id number], Rx#[id number])        Patient Diagnosis   M06.9 - Rheumatoid arthritis    Specialty clinical pharmacist review completed for an annual review of reassessment. Reviewed the following areas: current med list, reports of adverse effects, adherence and progress towards therapeutic goals.    Recommendations: none at this time.    Tasks added this encounter   5/24/2023 - Clinical - Follow Up Assesement (Annual)   Tasks due within next 3 months   8/24/2022 - Refill Call (Auto Added)     Kelly Mckee, PharmD  [facility] - Specialty Pharm[facility]  [address] [facility] [address]  Phone: [phone]  Fax: [phone]

## 2022-08-24 NOTE — TELEPHONE ENCOUNTER
Specialty Pharmacy - Refill Coordination    Specialty Medication Orders Linked to Encounter    Flowsheet Row Most Recent Value   Medication #1 tocilizumab (ACTEMRA) 162 mg/0.9 mL injection (Order#[id number], Rx#[id number])          Refill Questions - Documented Responses    Flowsheet Row Most Recent Value   Patient Availability and HIPAA Verification    Does patient want to proceed with activity? Yes   HIPAA/medical authority confirmed? Yes   Relationship to patient of person spoken to? Self   Refill Screening Questions    Changes to allergies? No   Changes to medications? No   New conditions since last clinic visit? No   Unplanned office visit, urgent care, ED, or hospital admission in the last 4 weeks? No   How does patient/caregiver feel medication is working? Good   Financial problems or insurance changes? No   How many doses of your specialty medications were missed in the last 4 weeks? 0   Would patient like to speak to a pharmacist? No   When does the patient need to receive the medication? 08/31/22   Refill Delivery Questions    How will the patient receive the medication? Mail   When does the patient need to receive the medication? 08/31/22   Shipping Address Home   Address in [facility] confirmed and updated if neccessary? Yes   Expected Copay ($) 0   Is the patient able to afford the medication copay? Yes   Payment Method zero copay   Days supply of Refill 28   Supplies needed? No supplies needed   Refill activity completed? Yes   Refill activity plan Refill scheduled   Shipment/Pickup Date: 08/25/22          Current Outpatient Medications   Medication Sig    albuterol (PROVENTIL/VENTOLIN HFA) 90 mcg/actuation inhaler as needed.    albuterol-ipratropium (DUO-NEB) 2.5 mg-0.5 mg/3 mL nebulizer solution ipratropium 0.5 mg-albuterol 3 mg (2.5 mg base)/3 mL nebulization soln   USE 1 AMPULE IN NEBULIZER EVERY 8 TO 12 HOURS AS NEEDED FOR COUGH    alendronate (FOSAMAX) 70 MG tablet Take 1 tablet (70 mg  total) by mouth every 7 days.    aspirin 81 MG Chew aspirin 81 mg chewable tablet   Chew 1 tablet every day by oral route for 30 days.    atorvastatin (LIPITOR) 40 MG tablet Take 40 mg by mouth once daily.    azithromycin (Z-ERIC) 250 MG tablet Take by mouth.    calcium-vitamin D 600 mg(1,500mg) -400 unit Tab Take by mouth once daily.    EPINEPHrine (EPIPEN) 0.3 mg/0.3 mL AtIn epinephrine 0.3 mg/0.3 mL injection, auto-injector   use AS DIRECTED    folic acid (FOLVITE) 1 MG tablet Take 1 tablet (1 mg total) by mouth once daily.    ibuprofen (ADVIL,MOTRIN) 800 MG tablet ibuprofen 800 mg tablet   TAKE ONE TABLET BY MOUTH DAILY AS NEEDED FOR PAIN    metoprolol tartrate (LOPRESSOR) 25 MG tablet Take 12.5 mg by mouth 2 (two) times daily.    multivitamin capsule Take 1 capsule by mouth once daily.    pantoprazole (PROTONIX) 40 MG tablet Take 1 tablet (40 mg total) by mouth once daily.    predniSONE (DELTASONE) 5 MG tablet Take 1 tablet (5 mg total) by mouth once daily.    tocilizumab (ACTEMRA) 162 mg/0.9 mL injection Inject 0.9 mLs (162 mg total) into the skin every 14 (fourteen) days.   Last reviewed on 3/16/2022  9:14 AM by Lula Cornell MA    Review of patient's allergies indicates:   Allergen Reactions    Povidone-iodine Rash and Swelling    Venom-wasp Anaphylaxis    Last reviewed on  3/16/2022 9:14 AM by Lula Cornell      Tasks added this encounter   9/21/2022 - Refill Call (Auto Added)   Tasks due within next 3 months   No tasks due.     Whit Ramirez  [facility] - Specialty Pharmacy  [address] [place] [id number]  Phone: [phone]  Fax: [phone]

## 2022-09-23 ENCOUNTER — SPECIALTY PHARMACY (OUTPATIENT)
Dept: PHARMACY | Facility: CLINIC | Age: 62
End: 2022-09-23

## 2022-09-23 NOTE — TELEPHONE ENCOUNTER
Incoming call from patient regarding Actemra. Refill request send to [facility]. Patient due to inject 9/28. Informed patient [facility] will reach out once refill received.

## 2022-09-27 NOTE — TELEPHONE ENCOUNTER
Specialty Pharmacy - Refill Coordination    Specialty Medication Orders Linked to Encounter      Flowsheet Row Most Recent Value   Medication #1 tocilizumab (ACTEMRA) 162 mg/0.9 mL injection (Order#[id number], Rx#[id number])            Refill Questions - Documented Responses      Flowsheet Row Most Recent Value   Patient Availability and HIPAA Verification    Does patient want to proceed with activity? Yes   HIPAA/medical authority confirmed? Yes   Relationship to patient of person spoken to? Self   Refill Screening Questions    Changes to allergies? No   Changes to medications? No   New conditions since last clinic visit? No   Unplanned office visit, urgent care, ED, or hospital admission in the last 4 weeks? No   How does patient/caregiver feel medication is working? Good   Financial problems or insurance changes? No   How many doses of your specialty medications were missed in the last 4 weeks? 0   Would patient like to speak to a pharmacist? No   When does the patient need to receive the medication? 09/28/22   Refill Delivery Questions    How will the patient receive the medication? Mail   When does the patient need to receive the medication? 09/28/22   Shipping Address Home   Address in [facility] confirmed and updated if neccessary? Yes   Expected Copay ($) 0   Is the patient able to afford the medication copay? Yes   Payment Method zero copay   Days supply of Refill 28   Supplies needed? No supplies needed   Refill activity completed? Yes   Refill activity plan Refill scheduled   Shipment/Pickup Date: 09/27/22            Current Outpatient Medications   Medication Sig    albuterol (PROVENTIL/VENTOLIN HFA) 90 mcg/actuation inhaler as needed.    albuterol-ipratropium (DUO-NEB) 2.5 mg-0.5 mg/3 mL nebulizer solution ipratropium 0.5 mg-albuterol 3 mg (2.5 mg base)/3 mL nebulization soln   USE 1 AMPULE IN NEBULIZER EVERY 8 TO 12 HOURS AS NEEDED FOR COUGH    alendronate (FOSAMAX) 70 MG tablet Take 1 tablet  (70 mg total) by mouth every 7 days.    aspirin 81 MG Chew aspirin 81 mg chewable tablet   Chew 1 tablet every day by oral route for 30 days.    atorvastatin (LIPITOR) 40 MG tablet Take 40 mg by mouth once daily.    azithromycin (Z-ERIC) 250 MG tablet Take by mouth.    calcium-vitamin D 600 mg(1,500mg) -400 unit Tab Take by mouth once daily.    EPINEPHrine (EPIPEN) 0.3 mg/0.3 mL AtIn epinephrine 0.3 mg/0.3 mL injection, auto-injector   use AS DIRECTED    folic acid (FOLVITE) 1 MG tablet Take 1 tablet (1 mg total) by mouth once daily.    ibuprofen (ADVIL,MOTRIN) 800 MG tablet ibuprofen 800 mg tablet   TAKE ONE TABLET BY MOUTH DAILY AS NEEDED FOR PAIN    metoprolol tartrate (LOPRESSOR) 25 MG tablet Take 12.5 mg by mouth 2 (two) times daily.    multivitamin capsule Take 1 capsule by mouth once daily.    pantoprazole (PROTONIX) 40 MG tablet Take 1 tablet (40 mg total) by mouth once daily.    predniSONE (DELTASONE) 5 MG tablet Take 1 tablet (5 mg total) by mouth once daily.    tocilizumab (ACTEMRA) 162 mg/0.9 mL injection Inject 0.9 mLs (162 mg total) into the skin every 14 (fourteen) days.   Last reviewed on 3/16/2022  9:14 AM by Lula Cornell MA    Review of patient's allergies indicates:   Allergen Reactions    Povidone-iodine Rash and Swelling    Venom-wasp Anaphylaxis    Last reviewed on  3/16/2022 9:14 AM by Lula Cornell      Tasks added this encounter   10/19/2022 - Refill Call (Auto Added)   Tasks due within next 3 months   No tasks due.     Afia Dyer [facility] - Specialty Pharmacy  [address] [place] [id number]  Phone: [phone]  Fax: [phone]

## 2022-10-24 ENCOUNTER — SPECIALTY PHARMACY (OUTPATIENT)
Dept: PHARMACY | Facility: CLINIC | Age: 62
End: 2022-10-24

## 2022-10-24 NOTE — TELEPHONE ENCOUNTER
Specialty Pharmacy - Refill Coordination    Specialty Medication Orders Linked to Encounter      Flowsheet Row Most Recent Value   Medication #1 tocilizumab (ACTEMRA) 162 mg/0.9 mL injection (Order#[id number], Rx#[id number])            Refill Questions - Documented Responses      Flowsheet Row Most Recent Value   Patient Availability and HIPAA Verification    Does patient want to proceed with activity? Yes   HIPAA/medical authority confirmed? Yes   Relationship to patient of person spoken to? Self   Refill Screening Questions    Changes to allergies? No   Changes to medications? No   New conditions since last clinic visit? No   Unplanned office visit, urgent care, ED, or hospital admission in the last 4 weeks? No   How does patient/caregiver feel medication is working? Good   Financial problems or insurance changes? No   How many doses of your specialty medications were missed in the last 4 weeks? 0   Would patient like to speak to a pharmacist? No   When does the patient need to receive the medication? 10/26/22   Refill Delivery Questions    How will the patient receive the medication? MEDRx   When does the patient need to receive the medication? 10/26/22   Shipping Address Home   Address in [facility] confirmed and updated if neccessary? Yes   Expected Copay ($) 0   Is the patient able to afford the medication copay? Yes   Payment Method zero copay   Days supply of Refill 28   Supplies needed? No supplies needed   Refill activity completed? Yes   Refill activity plan Refill scheduled   Shipment/Pickup Date: 10/25/22            Current Outpatient Medications   Medication Sig    albuterol (PROVENTIL/VENTOLIN HFA) 90 mcg/actuation inhaler as needed.    albuterol-ipratropium (DUO-NEB) 2.5 mg-0.5 mg/3 mL nebulizer solution ipratropium 0.5 mg-albuterol 3 mg (2.5 mg base)/3 mL nebulization soln   USE 1 AMPULE IN NEBULIZER EVERY 8 TO 12 HOURS AS NEEDED FOR COUGH    alendronate (FOSAMAX) 70 MG tablet Take 1 tablet  (70 mg total) by mouth every 7 days.    aspirin 81 MG Chew aspirin 81 mg chewable tablet   Chew 1 tablet every day by oral route for 30 days.    atorvastatin (LIPITOR) 40 MG tablet Take 40 mg by mouth once daily.    azithromycin (Z-ERIC) 250 MG tablet Take by mouth.    calcium-vitamin D 600 mg(1,500mg) -400 unit Tab Take by mouth once daily.    EPINEPHrine (EPIPEN) 0.3 mg/0.3 mL AtIn epinephrine 0.3 mg/0.3 mL injection, auto-injector   use AS DIRECTED    folic acid (FOLVITE) 1 MG tablet Take 1 tablet (1 mg total) by mouth once daily.    ibuprofen (ADVIL,MOTRIN) 800 MG tablet ibuprofen 800 mg tablet   TAKE ONE TABLET BY MOUTH DAILY AS NEEDED FOR PAIN    metoprolol tartrate (LOPRESSOR) 25 MG tablet Take 12.5 mg by mouth 2 (two) times daily.    multivitamin capsule Take 1 capsule by mouth once daily.    pantoprazole (PROTONIX) 40 MG tablet Take 1 tablet (40 mg total) by mouth once daily.    predniSONE (DELTASONE) 5 MG tablet Take 1 tablet (5 mg total) by mouth once daily.    tocilizumab (ACTEMRA) 162 mg/0.9 mL injection Inject 0.9 mLs (162 mg total) into the skin every 14 (fourteen) days.   Last reviewed on 3/16/2022  9:14 AM by Lula Cornell MA    Review of patient's allergies indicates:   Allergen Reactions    Povidone-iodine Rash and Swelling    Venom-wasp Anaphylaxis    Last reviewed on  3/16/2022 9:14 AM by Lula Cornell      Tasks added this encounter   11/16/2022 - Refill Call (Auto Added)   Tasks due within next 3 months   No tasks due.     Jerri Prieto, PharmD  [facility]  [address] [place] [address]  Phone: [phone]  Fax: [phone]

## 2022-11-17 ENCOUNTER — SPECIALTY PHARMACY (OUTPATIENT)
Dept: PHARMACY | Facility: CLINIC | Age: 62
End: 2022-11-17

## 2022-11-17 NOTE — TELEPHONE ENCOUNTER
Specialty Pharmacy - Refill Coordination    Specialty Medication Orders Linked to Encounter      Flowsheet Row Most Recent Value   Medication #1 tocilizumab (ACTEMRA) 162 mg/0.9 mL injection (Order#[id number], Rx#[id number])            Refill Questions - Documented Responses      Flowsheet Row Most Recent Value   Patient Availability and HIPAA Verification    Does patient want to proceed with activity? Yes   HIPAA/medical authority confirmed? Yes   Relationship to patient of person spoken to? Self   Refill Screening Questions    Changes to allergies? No   Changes to medications? No   New conditions since last clinic visit? No   Unplanned office visit, urgent care, ED, or hospital admission in the last 4 weeks? No   How does patient/caregiver feel medication is working? Good   Financial problems or insurance changes? No   How many doses of your specialty medications were missed in the last 4 weeks? 0   Would patient like to speak to a pharmacist? No   When does the patient need to receive the medication? 11/23/22   Refill Delivery Questions    How will the patient receive the medication? Mail   When does the patient need to receive the medication? 11/23/22   Shipping Address Home   Address in [facility] confirmed and updated if neccessary? Yes   Expected Copay ($) 0   Is the patient able to afford the medication copay? Yes   Payment Method zero copay   Days supply of Refill 28   Supplies needed? No supplies needed   Refill activity completed? Yes   Refill activity plan Refill scheduled   Shipment/Pickup Date: 11/21/22            Current Outpatient Medications   Medication Sig    albuterol (PROVENTIL/VENTOLIN HFA) 90 mcg/actuation inhaler as needed.    albuterol-ipratropium (DUO-NEB) 2.5 mg-0.5 mg/3 mL nebulizer solution ipratropium 0.5 mg-albuterol 3 mg (2.5 mg base)/3 mL nebulization soln   USE 1 AMPULE IN NEBULIZER EVERY 8 TO 12 HOURS AS NEEDED FOR COUGH    alendronate (FOSAMAX) 70 MG tablet Take 1 tablet  (70 mg total) by mouth every 7 days.    aspirin 81 MG Chew aspirin 81 mg chewable tablet   Chew 1 tablet every day by oral route for 30 days.    atorvastatin (LIPITOR) 40 MG tablet Take 40 mg by mouth once daily.    azithromycin (Z-ERIC) 250 MG tablet Take by mouth.    calcium-vitamin D 600 mg(1,500mg) -400 unit Tab Take by mouth once daily.    EPINEPHrine (EPIPEN) 0.3 mg/0.3 mL AtIn epinephrine 0.3 mg/0.3 mL injection, auto-injector   use AS DIRECTED    folic acid (FOLVITE) 1 MG tablet Take 1 tablet (1 mg total) by mouth once daily.    ibuprofen (ADVIL,MOTRIN) 800 MG tablet ibuprofen 800 mg tablet   TAKE ONE TABLET BY MOUTH DAILY AS NEEDED FOR PAIN    metoprolol tartrate (LOPRESSOR) 25 MG tablet Take 12.5 mg by mouth 2 (two) times daily.    multivitamin capsule Take 1 capsule by mouth once daily.    pantoprazole (PROTONIX) 40 MG tablet Take 1 tablet (40 mg total) by mouth once daily.    predniSONE (DELTASONE) 5 MG tablet Take 1 tablet (5 mg total) by mouth once daily.    tocilizumab (ACTEMRA) 162 mg/0.9 mL injection Inject 0.9 mLs (162 mg total) into the skin every 14 (fourteen) days.   Last reviewed on 3/16/2022  9:14 AM by Lula Cornell MA    Review of patient's allergies indicates:   Allergen Reactions    Povidone-iodine Rash and Swelling    Venom-wasp Anaphylaxis    Last reviewed on  3/16/2022 9:14 AM by Lula Cornell      Tasks added this encounter   12/14/2022 - Refill Call (Auto Added)   Tasks due within next 3 months   No tasks due.     [facility] Dyer [facility] - Specialty Pharmacy  [address] [place] [id number]  Phone: [phone]  Fax: [phone]

## 2022-12-14 ENCOUNTER — SPECIALTY PHARMACY (OUTPATIENT)
Dept: PHARMACY | Facility: CLINIC | Age: 62
End: 2022-12-14

## 2022-12-14 NOTE — TELEPHONE ENCOUNTER
Specialty Pharmacy - Refill Coordination    Specialty Medication Orders Linked to Encounter      Flowsheet Row Most Recent Value   Medication #1 tocilizumab (ACTEMRA) 162 mg/0.9 mL injection (Order#[id number], Rx#[id number])            Refill Questions - Documented Responses      Flowsheet Row Most Recent Value   Patient Availability and HIPAA Verification    Does patient want to proceed with activity? Yes   HIPAA/medical authority confirmed? Yes   Relationship to patient of person spoken to? Self   Refill Screening Questions    Changes to allergies? No   Changes to medications? No   New conditions since last clinic visit? No   Unplanned office visit, urgent care, ED, or hospital admission in the last 4 weeks? No   How does patient/caregiver feel medication is working? Good   Financial problems or insurance changes? No   How many doses of your specialty medications were missed in the last 4 weeks? 0   Would patient like to speak to a pharmacist? No   When does the patient need to receive the medication? 12/21/22   Refill Delivery Questions    How will the patient receive the medication? Mail   When does the patient need to receive the medication? 12/21/22   Shipping Address Home   Address in [facility] confirmed and updated if neccessary? Yes   Expected Copay ($) 0   Is the patient able to afford the medication copay? Yes   Payment Method zero copay   Days supply of Refill 28   Supplies needed? No supplies needed   Refill activity completed? Yes   Refill activity plan Refill scheduled   Shipment/Pickup Date: 12/19/22            Current Outpatient Medications   Medication Sig    albuterol (PROVENTIL/VENTOLIN HFA) 90 mcg/actuation inhaler as needed.    albuterol-ipratropium (DUO-NEB) 2.5 mg-0.5 mg/3 mL nebulizer solution ipratropium 0.5 mg-albuterol 3 mg (2.5 mg base)/3 mL nebulization soln   USE 1 AMPULE IN NEBULIZER EVERY 8 TO 12 HOURS AS NEEDED FOR COUGH    alendronate (FOSAMAX) 70 MG tablet Take 1 tablet  (70 mg total) by mouth every 7 days.    aspirin 81 MG Chew aspirin 81 mg chewable tablet   Chew 1 tablet every day by oral route for 30 days.    atorvastatin (LIPITOR) 40 MG tablet Take 40 mg by mouth once daily.    azithromycin (Z-ERIC) 250 MG tablet Take by mouth.    calcium-vitamin D 600 mg(1,500mg) -400 unit Tab Take by mouth once daily.    EPINEPHrine (EPIPEN) 0.3 mg/0.3 mL AtIn epinephrine 0.3 mg/0.3 mL injection, auto-injector   use AS DIRECTED    folic acid (FOLVITE) 1 MG tablet Take 1 tablet (1 mg total) by mouth once daily.    ibuprofen (ADVIL,MOTRIN) 800 MG tablet ibuprofen 800 mg tablet   TAKE ONE TABLET BY MOUTH DAILY AS NEEDED FOR PAIN    metoprolol tartrate (LOPRESSOR) 25 MG tablet Take 12.5 mg by mouth 2 (two) times daily.    multivitamin capsule Take 1 capsule by mouth once daily.    pantoprazole (PROTONIX) 40 MG tablet Take 1 tablet (40 mg total) by mouth once daily.    predniSONE (DELTASONE) 5 MG tablet Take 1 tablet (5 mg total) by mouth once daily. (Patient not taking: Reported on 12/6/2022)    tocilizumab (ACTEMRA) 162 mg/0.9 mL injection Inject 0.9 mLs (162 mg total) into the skin every 14 (fourteen) days.   Last reviewed on 12/6/2022  9:26 AM by Adelina Lang MA    Review of patient's allergies indicates:   Allergen Reactions    Povidone-iodine Rash and Swelling    Venom-wasp Anaphylaxis    Last reviewed on  12/6/2022 9:24 AM by Adelina Lang      Tasks added this encounter   1/11/2023 - Refill Call (Auto Added)   Tasks due within next 3 months   No tasks due.     Afia Dyer [facility]  [address] [facility] [address]  Phone: [phone]  Fax: [phone]

## 2023-01-11 ENCOUNTER — SPECIALTY PHARMACY (OUTPATIENT)
Dept: PHARMACY | Facility: CLINIC | Age: 63
End: 2023-01-11

## 2023-01-11 NOTE — TELEPHONE ENCOUNTER
Specialty Pharmacy - Refill Coordination    Specialty Medication Orders Linked to Encounter      Flowsheet Row Most Recent Value   Medication #1 tocilizumab (ACTEMRA) 162 mg/0.9 mL injection (Order#[id number], Rx#[id number])            Refill Questions - Documented Responses      Flowsheet Row Most Recent Value   Patient Availability and HIPAA Verification    Does patient want to proceed with activity? Yes   HIPAA/medical authority confirmed? Yes   Relationship to patient of person spoken to? Self   Refill Screening Questions    Changes to allergies? No   Changes to medications? No   New conditions since last clinic visit? No   Unplanned office visit, urgent care, ED, or hospital admission in the last 4 weeks? No   How does patient/caregiver feel medication is working? Good   Financial problems or insurance changes? No   How many doses of your specialty medications were missed in the last 4 weeks? 0   Would patient like to speak to a pharmacist? No   When does the patient need to receive the medication? 01/18/23   Refill Delivery Questions    How will the patient receive the medication? Mail   When does the patient need to receive the medication? 01/18/23   Shipping Address Home   Address in [facility] confirmed and updated if neccessary? Yes   Expected Copay ($) 0   Is the patient able to afford the medication copay? Yes   Payment Method zero copay   Days supply of Refill 28   Supplies needed? No supplies needed   Refill activity completed? Yes   Refill activity plan Refill scheduled   Shipment/Pickup Date: 01/12/23            Current Outpatient Medications   Medication Sig    albuterol (PROVENTIL/VENTOLIN HFA) 90 mcg/actuation inhaler as needed.    albuterol-ipratropium (DUO-NEB) 2.5 mg-0.5 mg/3 mL nebulizer solution ipratropium 0.5 mg-albuterol 3 mg (2.5 mg base)/3 mL nebulization soln   USE 1 AMPULE IN NEBULIZER EVERY 8 TO 12 HOURS AS NEEDED FOR COUGH    alendronate (FOSAMAX) 70 MG tablet Take 1 tablet  (70 mg total) by mouth every 7 days.    aspirin 81 MG Chew aspirin 81 mg chewable tablet   Chew 1 tablet every day by oral route for 30 days.    atorvastatin (LIPITOR) 40 MG tablet Take 40 mg by mouth once daily.    azithromycin (Z-ERIC) 250 MG tablet Take by mouth.    calcium-vitamin D 600 mg(1,500mg) -400 unit Tab Take by mouth once daily.    EPINEPHrine (EPIPEN) 0.3 mg/0.3 mL AtIn epinephrine 0.3 mg/0.3 mL injection, auto-injector   use AS DIRECTED    folic acid (FOLVITE) 1 MG tablet Take 1 tablet (1 mg total) by mouth once daily.    ibuprofen (ADVIL,MOTRIN) 800 MG tablet ibuprofen 800 mg tablet   TAKE ONE TABLET BY MOUTH DAILY AS NEEDED FOR PAIN    metoprolol tartrate (LOPRESSOR) 25 MG tablet Take 12.5 mg by mouth 2 (two) times daily.    multivitamin capsule Take 1 capsule by mouth once daily.    pantoprazole (PROTONIX) 40 MG tablet Take 1 tablet (40 mg total) by mouth once daily.    predniSONE (DELTASONE) 5 MG tablet Take 1 tablet (5 mg total) by mouth once daily. (Patient not taking: Reported on 12/6/2022)    tocilizumab (ACTEMRA) 162 mg/0.9 mL injection Inject 0.9 mLs (162 mg total) into the skin every 14 (fourteen) days.   Last reviewed on 12/6/2022  9:26 AM by Adelina Lang MA    Review of patient's allergies indicates:   Allergen Reactions    Povidone-iodine Rash and Swelling    Venom-wasp Anaphylaxis    Last reviewed on  12/6/2022 9:24 AM by Adelina Lang      Tasks added this encounter   2/8/2023 - Refill Call (Auto Added)   Tasks due within next 3 months   No tasks due.     Catherine Perez, PharmD  [facility]  [address] [facility] [address]  Phone: [phone]  Fax: [phone]

## 2023-02-13 ENCOUNTER — SPECIALTY PHARMACY (OUTPATIENT)
Dept: PHARMACY | Facility: CLINIC | Age: 63
End: 2023-02-13

## 2023-02-13 NOTE — TELEPHONE ENCOUNTER
Specialty Pharmacy - Refill Coordination    Specialty Medication Orders Linked to Encounter      Flowsheet Row Most Recent Value   Medication #1 tocilizumab (ACTEMRA) 162 mg/0.9 mL injection (Order#[id number], Rx#[id number])            Refill Questions - Documented Responses      Flowsheet Row Most Recent Value   Patient Availability and HIPAA Verification    Does patient want to proceed with activity? Yes   HIPAA/medical authority confirmed? Yes   Relationship to patient of person spoken to? Self   Refill Screening Questions    Changes to allergies? No   Changes to medications? No   New conditions since last clinic visit? No   Unplanned office visit, urgent care, ED, or hospital admission in the last 4 weeks? No   How does patient/caregiver feel medication is working? Good   Financial problems or insurance changes? No   How many doses of your specialty medications were missed in the last 4 weeks? 0   Would patient like to speak to a pharmacist? No   When does the patient need to receive the medication? 02/15/23   Refill Delivery Questions    How will the patient receive the medication? Mail   When does the patient need to receive the medication? 02/15/23   Shipping Address Home   Address in [facility] confirmed and updated if neccessary? Yes   Expected Copay ($) 0   Is the patient able to afford the medication copay? Yes   Payment Method zero copay   Days supply of Refill 28   Supplies needed? No supplies needed   Refill activity completed? Yes   Refill activity plan Refill scheduled   Shipment/Pickup Date: 02/14/23            Current Outpatient Medications   Medication Sig    albuterol (PROVENTIL/VENTOLIN HFA) 90 mcg/actuation inhaler as needed.    albuterol-ipratropium (DUO-NEB) 2.5 mg-0.5 mg/3 mL nebulizer solution ipratropium 0.5 mg-albuterol 3 mg (2.5 mg base)/3 mL nebulization soln   USE 1 AMPULE IN NEBULIZER EVERY 8 TO 12 HOURS AS NEEDED FOR COUGH    alendronate (FOSAMAX) 70 MG tablet Take 1 tablet  (70 mg total) by mouth every 7 days.    aspirin 81 MG Chew aspirin 81 mg chewable tablet   Chew 1 tablet every day by oral route for 30 days.    atorvastatin (LIPITOR) 40 MG tablet Take 40 mg by mouth once daily.    azithromycin (Z-ERIC) 250 MG tablet Take by mouth.    calcium-vitamin D 600 mg(1,500mg) -400 unit Tab Take by mouth once daily.    EPINEPHrine (EPIPEN) 0.3 mg/0.3 mL AtIn epinephrine 0.3 mg/0.3 mL injection, auto-injector   use AS DIRECTED    folic acid (FOLVITE) 1 MG tablet Take 1 tablet (1 mg total) by mouth once daily.    ibuprofen (ADVIL,MOTRIN) 800 MG tablet ibuprofen 800 mg tablet   TAKE ONE TABLET BY MOUTH DAILY AS NEEDED FOR PAIN    metoprolol tartrate (LOPRESSOR) 25 MG tablet Take 12.5 mg by mouth 2 (two) times daily.    multivitamin capsule Take 1 capsule by mouth once daily.    pantoprazole (PROTONIX) 40 MG tablet Take 1 tablet (40 mg total) by mouth once daily.    predniSONE (DELTASONE) 5 MG tablet Take 1 tablet (5 mg total) by mouth once daily. (Patient not taking: Reported on 12/6/2022)    tocilizumab (ACTEMRA) 162 mg/0.9 mL injection Inject 0.9 mLs (162 mg total) into the skin every 14 (fourteen) days.   Last reviewed on 12/6/2022  9:26 AM by Adelina Lang MA    Review of patient's allergies indicates:   Allergen Reactions    Povidone-iodine Rash and Swelling    Venom-wasp Anaphylaxis    Last reviewed on  1/30/2023 8:40 AM by Christiane Wyman      Tasks added this encounter   3/8/2023 - Refill Call (Auto Added)   Tasks due within next 3 months   No tasks due.     Deirdre Kahn, PharmD  [facility]  [address] [facility] [address]  Phone: [phone]  Fax: [phone]

## 2023-03-08 ENCOUNTER — SPECIALTY PHARMACY (OUTPATIENT)
Dept: PHARMACY | Facility: CLINIC | Age: 63
End: 2023-03-08

## 2023-03-13 NOTE — TELEPHONE ENCOUNTER
Specialty Pharmacy - Refill Coordination    Specialty Medication Orders Linked to Encounter      Flowsheet Row Most Recent Value   Medication #1 tocilizumab (ACTEMRA) 162 mg/0.9 mL injection (Order#[id number], Rx#[id number])            Refill Questions - Documented Responses      Flowsheet Row Most Recent Value   Patient Availability and HIPAA Verification    Does patient want to proceed with activity? Yes   HIPAA/medical authority confirmed? Yes   Relationship to patient of person spoken to? Self   Refill Screening Questions    Changes to allergies? No   Changes to medications? No   New conditions since last clinic visit? No   Unplanned office visit, urgent care, ED, or hospital admission in the last 4 weeks? No   How does patient/caregiver feel medication is working? Good   Financial problems or insurance changes? No   How many doses of your specialty medications were missed in the last 4 weeks? 0   Would patient like to speak to a pharmacist? No   When does the patient need to receive the medication? 03/15/23   Refill Delivery Questions    How will the patient receive the medication? Mail   When does the patient need to receive the medication? 03/15/23   Shipping Address Home   Address in [facility] confirmed and updated if neccessary? Yes   Expected Copay ($) 0   Is the patient able to afford the medication copay? Yes   Payment Method zero copay   Days supply of Refill 28   Supplies needed? No supplies needed   Refill activity completed? Yes   Refill activity plan Refill scheduled   Shipment/Pickup Date: 03/13/23            Current Outpatient Medications   Medication Sig    albuterol (PROVENTIL/VENTOLIN HFA) 90 mcg/actuation inhaler as needed.    albuterol-ipratropium (DUO-NEB) 2.5 mg-0.5 mg/3 mL nebulizer solution ipratropium 0.5 mg-albuterol 3 mg (2.5 mg base)/3 mL nebulization soln   USE 1 AMPULE IN NEBULIZER EVERY 8 TO 12 HOURS AS NEEDED FOR COUGH    alendronate (FOSAMAX) 70 MG tablet Take 1 tablet  (70 mg total) by mouth every 7 days.    aspirin 81 MG Chew aspirin 81 mg chewable tablet   Chew 1 tablet every day by oral route for 30 days.    atorvastatin (LIPITOR) 40 MG tablet Take 40 mg by mouth once daily.    azithromycin (Z-ERIC) 250 MG tablet Take by mouth.    calcium-vitamin D 600 mg(1,500mg) -400 unit Tab Take by mouth once daily.    EPINEPHrine (EPIPEN) 0.3 mg/0.3 mL AtIn epinephrine 0.3 mg/0.3 mL injection, auto-injector   use AS DIRECTED    folic acid (FOLVITE) 1 MG tablet Take 1 tablet (1 mg total) by mouth once daily.    ibuprofen (ADVIL,MOTRIN) 800 MG tablet ibuprofen 800 mg tablet   TAKE ONE TABLET BY MOUTH DAILY AS NEEDED FOR PAIN    metoprolol tartrate (LOPRESSOR) 25 MG tablet Take 12.5 mg by mouth 2 (two) times daily.    multivitamin capsule Take 1 capsule by mouth once daily.    pantoprazole (PROTONIX) 40 MG tablet Take 1 tablet (40 mg total) by mouth once daily.    predniSONE (DELTASONE) 5 MG tablet Take 1 tablet (5 mg total) by mouth once daily. (Patient not taking: Reported on 12/6/2022)    tocilizumab (ACTEMRA) 162 mg/0.9 mL injection Inject 0.9 mLs (162 mg total) into the skin every 14 (fourteen) days.   Last reviewed on 12/6/2022  9:26 AM by Adelina Lang MA    Review of patient's allergies indicates:   Allergen Reactions    Povidone-iodine Rash and Swelling    Venom-wasp Anaphylaxis    Last reviewed on  3/3/2023 12:22 PM by Christiane Wyman      Tasks added this encounter   4/5/2023 - Refill Call (Auto Added)   Tasks due within next 3 months   5/24/2023 - Clinical - Follow Up Assesement (Annual)     Kimberlee Kelsey, PharmD  [facility]  [address] [place] [address]  Phone: [phone]  Fax: [phone]

## 2023-04-05 ENCOUNTER — SPECIALTY PHARMACY (OUTPATIENT)
Dept: PHARMACY | Facility: CLINIC | Age: 63
End: 2023-04-05

## 2023-04-05 NOTE — TELEPHONE ENCOUNTER
Incoming call from patient for Actemra refill. RTS until 4/6. Patient aware [facility] will call back then.

## 2023-04-10 NOTE — TELEPHONE ENCOUNTER
Specialty Pharmacy - Refill Coordination    Specialty Medication Orders Linked to Encounter      Flowsheet Row Most Recent Value   Medication #1 tocilizumab (ACTEMRA) 162 mg/0.9 mL injection (Order#[id number], Rx#[id number])            Refill Questions - Documented Responses      Flowsheet Row Most Recent Value   Patient Availability and HIPAA Verification    Does patient want to proceed with activity? Yes   HIPAA/medical authority confirmed? Yes   Relationship to patient of person spoken to? Self   Refill Screening Questions    Changes to allergies? No   Changes to medications? No   New conditions since last clinic visit? No   Unplanned office visit, urgent care, ED, or hospital admission in the last 4 weeks? No   How does patient/caregiver feel medication is working? Good   Financial problems or insurance changes? No   How many doses of your specialty medications were missed in the last 4 weeks? 0   Would patient like to speak to a pharmacist? No   When does the patient need to receive the medication? 04/12/23   Refill Delivery Questions    How will the patient receive the medication? Mail   When does the patient need to receive the medication? 04/12/23   Shipping Address Home   Address in [facility] confirmed and updated if neccessary? Yes   Expected Copay ($) 0   Payment Method zero copay   Days supply of Refill 28   Supplies needed? No supplies needed   Refill activity completed? Yes   Refill activity plan Refill scheduled   Shipment/Pickup Date: 04/10/23            Current Outpatient Medications   Medication Sig    albuterol (PROVENTIL/VENTOLIN HFA) 90 mcg/actuation inhaler as needed.    albuterol-ipratropium (DUO-NEB) 2.5 mg-0.5 mg/3 mL nebulizer solution ipratropium 0.5 mg-albuterol 3 mg (2.5 mg base)/3 mL nebulization soln   USE 1 AMPULE IN NEBULIZER EVERY 8 TO 12 HOURS AS NEEDED FOR COUGH    alendronate (FOSAMAX) 70 MG tablet Take 1 tablet (70 mg total) by mouth every 7 days.    aspirin 81 MG Chew  aspirin 81 mg chewable tablet   Chew 1 tablet every day by oral route for 30 days.    atorvastatin (LIPITOR) 40 MG tablet Take 40 mg by mouth once daily.    azithromycin (Z-ERIC) 250 MG tablet Take by mouth.    calcium-vitamin D 600 mg(1,500mg) -400 unit Tab Take by mouth once daily.    EPINEPHrine (EPIPEN) 0.3 mg/0.3 mL AtIn epinephrine 0.3 mg/0.3 mL injection, auto-injector   use AS DIRECTED    folic acid (FOLVITE) 1 MG tablet Take 1 tablet (1 mg total) by mouth once daily.    ibuprofen (ADVIL,MOTRIN) 800 MG tablet ibuprofen 800 mg tablet   TAKE ONE TABLET BY MOUTH DAILY AS NEEDED FOR PAIN    metoprolol tartrate (LOPRESSOR) 25 MG tablet Take 12.5 mg by mouth 2 (two) times daily.    multivitamin capsule Take 1 capsule by mouth once daily.    pantoprazole (PROTONIX) 40 MG tablet Take 1 tablet (40 mg total) by mouth once daily.    predniSONE (DELTASONE) 5 MG tablet Take 1 tablet (5 mg total) by mouth once daily. (Patient not taking: Reported on 12/6/2022)    tocilizumab (ACTEMRA) 162 mg/0.9 mL injection Inject 0.9 mLs (162 mg total) into the skin every 14 (fourteen) days.   Last reviewed on 12/6/2022  9:26 AM by Adelina Lang MA    Review of patient's allergies indicates:   Allergen Reactions    Povidone-iodine Rash and Swelling    Venom-wasp Anaphylaxis    Last reviewed on  3/3/2023 12:22 PM by Christiane Wyman      Tasks added this encounter   5/3/2023 - Refill Call (Auto Added)   Tasks due within next 3 months   5/24/2023 - Clinical - Follow Up Assesement (Annual)     Kristan Gaspar, PharmD  [facility]  [address] [facility] [address]  Phone: [phone]  Fax: [phone]

## 2023-05-03 ENCOUNTER — SPECIALTY PHARMACY (OUTPATIENT)
Dept: PHARMACY | Facility: CLINIC | Age: 63
End: 2023-05-03

## 2023-05-03 NOTE — TELEPHONE ENCOUNTER
Outgoing call: Patient is due to inject on 5/10, I informed the patient that the insurance was rejecting too soon until 5/4 and [facility] will follow up then to set up refill.

## 2023-05-04 NOTE — TELEPHONE ENCOUNTER
Specialty Pharmacy - Refill Coordination    Specialty Medication Orders Linked to Encounter      Flowsheet Row Most Recent Value   Medication #1 tocilizumab (ACTEMRA) 162 mg/0.9 mL injection (Order#[id number], Rx#[id number])            Refill Questions - Documented Responses      Flowsheet Row Most Recent Value   Patient Availability and HIPAA Verification    Does patient want to proceed with activity? Yes   HIPAA/medical authority confirmed? Yes   Relationship to patient of person spoken to? Self   Refill Screening Questions    Changes to allergies? No   Changes to medications? No   New conditions since last clinic visit? No   Unplanned office visit, urgent care, ED, or hospital admission in the last 4 weeks? No   How does patient/caregiver feel medication is working? Good   Financial problems or insurance changes? No   How many doses of your specialty medications were missed in the last 4 weeks? 0   Would patient like to speak to a pharmacist? No   When does the patient need to receive the medication? 05/10/23   Refill Delivery Questions    How will the patient receive the medication? Mail   When does the patient need to receive the medication? 05/10/23   Shipping Address Home   Address in [facility] confirmed and updated if neccessary? Yes   Expected Copay ($) 0   Is the patient able to afford the medication copay? Yes   Payment Method zero copay   Days supply of Refill 28   Supplies needed? No supplies needed   Refill activity completed? Yes   Refill activity plan Refill scheduled   Shipment/Pickup Date: 05/08/23            Current Outpatient Medications   Medication Sig    albuterol (PROVENTIL/VENTOLIN HFA) 90 mcg/actuation inhaler as needed.    albuterol-ipratropium (DUO-NEB) 2.5 mg-0.5 mg/3 mL nebulizer solution ipratropium 0.5 mg-albuterol 3 mg (2.5 mg base)/3 mL nebulization soln   USE 1 AMPULE IN NEBULIZER EVERY 8 TO 12 HOURS AS NEEDED FOR COUGH    alendronate (FOSAMAX) 70 MG tablet Take 1 tablet  (70 mg total) by mouth every 7 days.    aspirin 81 MG Chew aspirin 81 mg chewable tablet   Chew 1 tablet every day by oral route for 30 days.    atorvastatin (LIPITOR) 40 MG tablet Take 40 mg by mouth once daily.    azithromycin (Z-ERIC) 250 MG tablet Take by mouth.    calcium-vitamin D 600 mg(1,500mg) -400 unit Tab Take by mouth once daily.    EPINEPHrine (EPIPEN) 0.3 mg/0.3 mL AtIn epinephrine 0.3 mg/0.3 mL injection, auto-injector   use AS DIRECTED    folic acid (FOLVITE) 1 MG tablet Take 1 tablet (1 mg total) by mouth once daily.    ibuprofen (ADVIL,MOTRIN) 800 MG tablet ibuprofen 800 mg tablet   TAKE ONE TABLET BY MOUTH DAILY AS NEEDED FOR PAIN    metoprolol tartrate (LOPRESSOR) 25 MG tablet Take 12.5 mg by mouth 2 (two) times daily.    multivitamin capsule Take 1 capsule by mouth once daily.    pantoprazole (PROTONIX) 40 MG tablet Take 1 tablet (40 mg total) by mouth once daily.    predniSONE (DELTASONE) 5 MG tablet Take 1 tablet (5 mg total) by mouth once daily. (Patient not taking: Reported on 12/6/2022)    tocilizumab (ACTEMRA) 162 mg/0.9 mL injection Inject 0.9 mLs (162 mg total) into the skin every 14 (fourteen) days.   Last reviewed on 12/6/2022  9:26 AM by Adelina Lang MA    Review of patient's allergies indicates:   Allergen Reactions    Povidone-iodine Rash and Swelling    Venom-wasp Anaphylaxis    Last reviewed on  3/3/2023 12:22 PM by Christiane Wyman      Tasks added this encounter   No tasks added.   Tasks due within next 3 months   5/24/2023 - Clinical Assessment (1 year recurrence)  5/4/2023 - Refill Coordination Outreach (1 time occurrence)     [facility]  [address] [place] [address]  Phone: [phone]  Fax: [phone]

## 2023-05-29 ENCOUNTER — SPECIALTY PHARMACY (OUTPATIENT)
Dept: PHARMACY | Facility: CLINIC | Age: 63
End: 2023-05-29

## 2023-05-29 NOTE — TELEPHONE ENCOUNTER
Specialty Pharmacy - Clinical Reassessment    Specialty Medication Orders Linked to Encounter      Flowsheet Row Most Recent Value   Medication #1 tocilizumab (ACTEMRA) 162 mg/0.9 mL injection (Order#[id number], Rx#[id number])          Patient Diagnosis   M06.9 - Rheumatoid arthritis    Constance Masters is a 63 y.o. female, who is followed by the specialty pharmacy service for management and education of her RA.  She has been on therapy with Actemra since 11/2021.  I have reviewed her electronic medical record and current medication list and determined that specialty medication adjustment Is not needed at this time.    Patient has not experienced adverse events.  She Is adherent reporting 0 missed doses since last review.  Adherence has been encouraged with the following mechanism(s): proactive refill calls.  She is meeting goals of therapy and will continue treatment.        5/4/2023 4/10/2023 3/13/2023 2/13/2023 1/11/2023 12/14/2022 11/17/2022   Follow Up Review   # of missed doses 0 0 0 0 0 0 0   New Medications? No No No No No No No   New Conditions? No No No No No No No   New Allergies? No No No No No No No   Med Effective? Good Good Good Good Good Good Good   Urgent Care? No No No No No No No   Requested Pharmacist? No No No No No No No         Therapy is appropriate to continue.    Therapy is effective: Yes  On scale of 1 to 10, how does patient rank quality of life? (10 - Best): Unable to Assess  Recommendations: none at this time.  Review Method: Chart Review    Tasks added this encounter   No tasks added.   Tasks due within next 3 months   5/24/2023 - Clinical Assessment (1 year recurrence)  5/29/2023 - Refill Coordination Outreach (1 time occurrence)     Kristan Gaspar, PharmD  [facility]  [address]  [facility] [address]  Phone: [phone]  Fax: [phone]

## 2023-06-01 ENCOUNTER — SOCIAL WORK (OUTPATIENT)
Dept: ADMINISTRATIVE | Facility: OTHER | Age: 63
End: 2023-06-01

## 2023-06-01 NOTE — PROGRESS NOTES
SW received consult for assist with transportation. SW placed a call to pt @ [phone] to discuss. Pt states she is not needing assistance with transportation, but she is wanting her follow up appts to be scheduled on the same date as her spouse who is going through bladder cancer at this time. Pt states she has been in communication with the clinic scheduler to see if accommodations could be made. SW did inform pt she has access to Medicaid transport benefits. Pt was not interested in this at this time and states she would like all appts on the same day while she is in [place] so that her  can attend her appts and vice versa. No other needs mentioned at this time.     LIA Celaya    [phone] (phone)  [phone] (fax)

## 2023-06-06 ENCOUNTER — SPECIALTY PHARMACY (OUTPATIENT)
Dept: PHARMACY | Facility: CLINIC | Age: 63
End: 2023-06-06

## 2023-06-06 NOTE — TELEPHONE ENCOUNTER
Specialty Pharmacy - Refill Coordination  Specialty Pharmacy - Medication/Referral Authorization    Specialty Medication Orders Linked to Encounter      Flowsheet Row Most Recent Value   Medication #1 tocilizumab (ACTEMRA) 162 mg/0.9 mL injection (Order#[id number], Rx#[id number])            Refill Questions - Documented Responses      Flowsheet Row Most Recent Value   Patient Availability and HIPAA Verification    Does patient want to proceed with activity? Yes   HIPAA/medical authority confirmed? Yes   Relationship to patient of person spoken to? Self   Refill Screening Questions    Changes to allergies? No   Changes to medications? Yes  [restarting methotrexate]   New conditions since last clinic visit? No   Unplanned office visit, urgent care, ED, or hospital admission in the last 4 weeks? No   How does patient/caregiver feel medication is working? Fair   Financial problems or insurance changes? No   How many doses of your specialty medications were missed in the last 4 weeks? 0   Would patient like to speak to a pharmacist? No   When does the patient need to receive the medication? 06/08/23   Refill Delivery Questions    How will the patient receive the medication? Mail   When does the patient need to receive the medication? 06/08/23   Shipping Address Home   Address in [facility] confirmed and updated if neccessary? Yes   Expected Copay ($) 0   Is the patient able to afford the medication copay? Yes   Payment Method zero copay   Days supply of Refill 28   Supplies needed? No supplies needed   Refill activity completed? Yes   Refill activity plan Refill scheduled   Shipment/Pickup Date: 06/07/23            Current Outpatient Medications   Medication Sig    albuterol (PROVENTIL/VENTOLIN HFA) 90 mcg/actuation inhaler as needed.    albuterol-ipratropium (DUO-NEB) 2.5 mg-0.5 mg/3 mL nebulizer solution ipratropium 0.5 mg-albuterol 3 mg (2.5 mg base)/3 mL nebulization soln   USE 1 AMPULE IN NEBULIZER EVERY 8 TO  12 HOURS AS NEEDED FOR COUGH    alendronate (FOSAMAX) 70 MG tablet Take 1 tablet (70 mg total) by mouth every 7 days.    ammonium lactate (LAC-HYDRIN) 12 % lotion Apply topically 2 (two) times daily.    aspirin 81 MG Chew aspirin 81 mg chewable tablet   Chew 1 tablet every day by oral route for 30 days.    atorvastatin (LIPITOR) 40 MG tablet Take 40 mg by mouth once daily.    azithromycin (Z-ERIC) 250 MG tablet Take by mouth.    calcium-vitamin D 600 mg(1,500mg) -400 unit Tab Take by mouth once daily.    EPINEPHrine (EPIPEN) 0.3 mg/0.3 mL AtIn epinephrine 0.3 mg/0.3 mL injection, auto-injector   use AS DIRECTED    folic acid (FOLVITE) 1 MG tablet Take 1 tablet (1 mg total) by mouth once daily.    ibuprofen (ADVIL,MOTRIN) 800 MG tablet ibuprofen 800 mg tablet   TAKE ONE TABLET BY MOUTH DAILY AS NEEDED FOR PAIN (Patient not taking: Reported on 6/6/2023)    methotrexate 2.5 MG Tab Take 1 tablet (2.5 mg total) by mouth every 7 days.    metoprolol succinate (TOPROL-XL) 25 MG 24 hr tablet Take 25 mg by mouth.    metoprolol tartrate (LOPRESSOR) 25 MG tablet Take 12.5 mg by mouth 2 (two) times daily.    multivitamin capsule Take 1 capsule by mouth once daily.    pantoprazole (PROTONIX) 40 MG tablet Take 1 tablet (40 mg total) by mouth once daily.    predniSONE (DELTASONE) 5 MG tablet Take 1 tablet (5 mg total) by mouth once daily.    tocilizumab (ACTEMRA) 162 mg/0.9 mL injection Inject 0.9 mLs (162 mg total) into the skin every 14 (fourteen) days.   Last reviewed on 6/6/2023  8:00 AM by Antonieta Nelson MA    Review of patient's allergies indicates:   Allergen Reactions    Povidone-iodine Rash and Swelling    Venom-wasp Anaphylaxis    Last reviewed on  6/6/2023 7:58 AM by Antonieta Nelson      Tasks added this encounter   6/6/2024 - Benefits Review (Annual recurrence)   Tasks due within next 3 months   No tasks due.     Fidel Silva, PharmD  [facility]  [address]  [facility]  [address]  Phone: [phone]  Fax: [phone]

## 2023-06-06 NOTE — TELEPHONE ENCOUNTER
Patient in need of Actemra refill by 6/7/23. Informed patient that PA is needed and [facility] will reach back out when PA completed. Routing assigned rph for urgent PA.

## 2023-06-28 ENCOUNTER — SPECIALTY PHARMACY (OUTPATIENT)
Dept: PHARMACY | Facility: CLINIC | Age: 63
End: 2023-06-28

## 2023-06-28 NOTE — TELEPHONE ENCOUNTER
RFTS until 6/30 which would lead to patient receiving medication on 7/5 which is her injection date. Patient requested that we follow-up with insurance to see if we can get a RFTS override with insurance. Spoke with Kelsey with the Rx Help Desk who informed that mail order shipping overrides for the plan are handled by Duncan from [facility] t:[phone]. Spoke with Ana Rosa with [employer]. They were not able to resolve RFTS rejection today and had to put in a ticket (ticket # [id number]). They will call in 24 to 48 hours if  the RFTS override is approved so that we can reprocess. Outgoing call to inform patient that I will reach out as soon as there is an update.

## 2023-06-29 NOTE — TELEPHONE ENCOUNTER
Specialty Pharmacy - Refill Coordination  Specialty Pharmacy - Clinical Intervention    Specialty Medication Orders Linked to Encounter      Flowsheet Row Most Recent Value   Medication #1 tocilizumab (ACTEMRA) 162 mg/0.9 mL injection (Order#[id number], Rx#[id number])            Refill Questions - Documented Responses      Flowsheet Row Most Recent Value   Patient Availability and HIPAA Verification    Does patient want to proceed with activity? Yes   HIPAA/medical authority confirmed? Yes   Relationship to patient of person spoken to? Self   Refill Screening Questions    Changes to allergies? No   Changes to medications? Yes  [erythromycin eye oitment - therapy complete tomorrow]   New conditions since last clinic visit? Yes  [eye ulcer]   Unplanned office visit, urgent care, ED, or hospital admission in the last 4 weeks? Yes  [eye ulcer]   How does patient/caregiver feel medication is working? Good   Financial problems or insurance changes? No   How many doses of your specialty medications were missed in the last 4 weeks? 0   Would patient like to speak to a pharmacist? No   When does the patient need to receive the medication? 07/05/23   Refill Delivery Questions    How will the patient receive the medication? Mail   When does the patient need to receive the medication? 07/05/23   Shipping Address Home   Address in [facility] confirmed and updated if neccessary? Yes   Expected Copay ($) 0   Is the patient able to afford the medication copay? Yes   Payment Method zero copay   Days supply of Refill 28   Supplies needed? No supplies needed   Refill activity completed? Yes   Refill activity plan Refill scheduled   Shipment/Pickup Date: 06/29/23            Current Outpatient Medications   Medication Sig    albuterol (PROVENTIL/VENTOLIN HFA) 90 mcg/actuation inhaler as needed.    albuterol-ipratropium (DUO-NEB) 2.5 mg-0.5 mg/3 mL nebulizer solution ipratropium 0.5 mg-albuterol 3 mg (2.5 mg base)/3 mL  nebulization soln   USE 1 AMPULE IN NEBULIZER EVERY 8 TO 12 HOURS AS NEEDED FOR COUGH    alendronate (FOSAMAX) 70 MG tablet Take 1 tablet (70 mg total) by mouth every 7 days.    ammonium lactate (LAC-HYDRIN) 12 % lotion Apply topically 2 (two) times daily.    aspirin 81 MG Chew aspirin 81 mg chewable tablet   Chew 1 tablet every day by oral route for 30 days.    atorvastatin (LIPITOR) 40 MG tablet Take 40 mg by mouth once daily.    azithromycin (Z-ERIC) 250 MG tablet Take by mouth.    calcium-vitamin D 600 mg(1,500mg) -400 unit Tab Take by mouth once daily.    EPINEPHrine (EPIPEN) 0.3 mg/0.3 mL AtIn epinephrine 0.3 mg/0.3 mL injection, auto-injector   use AS DIRECTED    folic acid (FOLVITE) 1 MG tablet Take 1 tablet (1 mg total) by mouth once daily.    ibuprofen (ADVIL,MOTRIN) 800 MG tablet ibuprofen 800 mg tablet   TAKE ONE TABLET BY MOUTH DAILY AS NEEDED FOR PAIN (Patient not taking: Reported on 6/6/2023)    methotrexate 2.5 MG Tab Take 1 tablet (2.5 mg total) by mouth every 7 days.    metoprolol succinate (TOPROL-XL) 25 MG 24 hr tablet Take 25 mg by mouth.    metoprolol tartrate (LOPRESSOR) 25 MG tablet Take 12.5 mg by mouth 2 (two) times daily.    multivitamin capsule Take 1 capsule by mouth once daily.    pantoprazole (PROTONIX) 40 MG tablet Take 1 tablet (40 mg total) by mouth once daily.    predniSONE (DELTASONE) 5 MG tablet Take 1 tablet (5 mg total) by mouth once daily.    tocilizumab (ACTEMRA) 162 mg/0.9 mL injection Inject 0.9 mLs (162 mg total) into the skin every 14 (fourteen) days.   Last reviewed on 6/6/2023  8:00 AM by Antonieta Nelson MA    Review of patient's allergies indicates:   Allergen Reactions    Povidone-iodine Rash and Swelling    Venom-wasp Anaphylaxis    Last reviewed on  6/6/2023 7:58 AM by Antonieta Nelson      Tasks added this encounter   No tasks added.   Tasks due within next 3 months   No tasks due.     Fidel Silva, PharmD  [facility]  [address] [place] [address]  [address]  [facility]  [facility] [address]  Phone: [phone]  Fax: [phone]

## 2023-07-20 ENCOUNTER — SPECIALTY PHARMACY (OUTPATIENT)
Dept: PHARMACY | Facility: CLINIC | Age: 63
End: 2023-07-20

## 2023-07-20 NOTE — TELEPHONE ENCOUNTER
Incoming call from patient requesting a refill for Actemra. Due to inject next dose on 8/2. No doses on hand. Patient is okay with a callback sooner to next injection date on 7/26.

## 2023-07-25 NOTE — TELEPHONE ENCOUNTER
Specialty Pharmacy - Refill Coordination    Specialty Medication Orders Linked to Encounter      Flowsheet Row Most Recent Value   Medication #1 tocilizumab (ACTEMRA) 162 mg/0.9 mL injection (Order#[id number], Rx#[id number])            Refill Questions - Documented Responses      Flowsheet Row Most Recent Value   Patient Availability and HIPAA Verification    Does patient want to proceed with activity? Yes   HIPAA/medical authority confirmed? Yes   Relationship to patient of person spoken to? Self   Refill Screening Questions    Changes to allergies? No   Changes to medications? No   New conditions since last clinic visit? No   Unplanned office visit, urgent care, ED, or hospital admission in the last 4 weeks? No   How does patient/caregiver feel medication is working? Good   Financial problems or insurance changes? No   How many doses of your specialty medications were missed in the last 4 weeks? 0   Would patient like to speak to a pharmacist? No   When does the patient need to receive the medication? 08/02/23   Refill Delivery Questions    How will the patient receive the medication? Mail   When does the patient need to receive the medication? 08/02/23   Shipping Address Home   Address in [facility] confirmed and updated if neccessary? Yes   Expected Copay ($) 0   Is the patient able to afford the medication copay? Yes   Payment Method zero copay   Days supply of Refill 28   Supplies needed? No supplies needed   Refill activity completed? Yes   Refill activity plan Refill scheduled   Shipment/Pickup Date: 07/27/23            Current Outpatient Medications   Medication Sig    albuterol (PROVENTIL/VENTOLIN HFA) 90 mcg/actuation inhaler as needed.    albuterol-ipratropium (DUO-NEB) 2.5 mg-0.5 mg/3 mL nebulizer solution ipratropium 0.5 mg-albuterol 3 mg (2.5 mg base)/3 mL nebulization soln   USE 1 AMPULE IN NEBULIZER EVERY 8 TO 12 HOURS AS NEEDED FOR COUGH    alendronate (FOSAMAX) 70 MG tablet Take 1 tablet  (70 mg total) by mouth every 7 days.    ammonium lactate (LAC-HYDRIN) 12 % lotion Apply topically 2 (two) times daily.    aspirin 81 MG Chew aspirin 81 mg chewable tablet   Chew 1 tablet every day by oral route for 30 days.    atorvastatin (LIPITOR) 40 MG tablet Take 40 mg by mouth once daily.    calcium-vitamin D 600 mg(1,500mg) -400 unit Tab Take by mouth once daily.    EPINEPHrine (EPIPEN) 0.3 mg/0.3 mL AtIn epinephrine 0.3 mg/0.3 mL injection, auto-injector   use AS DIRECTED    folic acid (FOLVITE) 1 MG tablet Take 1 tablet (1 mg total) by mouth once daily.    ibuprofen (ADVIL,MOTRIN) 800 MG tablet ibuprofen 800 mg tablet   TAKE ONE TABLET BY MOUTH DAILY AS NEEDED FOR PAIN (Patient not taking: Reported on 6/6/2023)    methotrexate 2.5 MG Tab Take 1 tablet (2.5 mg total) by mouth every 7 days.    metoprolol succinate (TOPROL-XL) 25 MG 24 hr tablet Take 25 mg by mouth.    metoprolol tartrate (LOPRESSOR) 25 MG tablet Take 12.5 mg by mouth 2 (two) times daily.    multivitamin capsule Take 1 capsule by mouth once daily.    pantoprazole (PROTONIX) 40 MG tablet Take 1 tablet (40 mg total) by mouth once daily.    predniSONE (DELTASONE) 5 MG tablet Take 1 tablet (5 mg total) by mouth once daily.    tocilizumab (ACTEMRA) 162 mg/0.9 mL injection Inject 0.9 mLs (162 mg total) into the skin every 14 (fourteen) days.   Last reviewed on 6/6/2023  8:00 AM by Antonieta Nelson MA    Review of patient's allergies indicates:   Allergen Reactions    Povidone-iodine Rash and Swelling    Venom-wasp Anaphylaxis    Last reviewed on  6/6/2023 7:58 AM by Antonieta Nelson      Tasks added this encounter   No tasks added.   Tasks due within next 3 months   No tasks due.     Giana Dickens, PharmD  [facility]  [address] [address]  Phone: [phone]  Fax: [phone]

## 2023-08-17 ENCOUNTER — SPECIALTY PHARMACY (OUTPATIENT)
Dept: PHARMACY | Facility: CLINIC | Age: 63
End: 2023-08-17

## 2023-08-17 NOTE — TELEPHONE ENCOUNTER
Outgoing call regarding actemra refill; per pt, she's due to inject on 8/30; informed her that a refill request was sent to md, and once approved [facility] will follow up to schedule delivery

## 2023-08-28 NOTE — TELEPHONE ENCOUNTER
Specialty Pharmacy - Refill Coordination    Specialty Medication Orders Linked to Encounter      Flowsheet Row Most Recent Value   Medication #1 tocilizumab (ACTEMRA) 162 mg/0.9 mL injection (Order#[id number], Rx#)            Refill Questions - Documented Responses      Flowsheet Row Most Recent Value   Patient Availability and HIPAA Verification    Does patient want to proceed with activity? Yes   HIPAA/medical authority confirmed? Yes   Relationship to patient of person spoken to? Self   Refill Screening Questions    Changes to allergies? No   Changes to medications? No   New conditions since last clinic visit? No   Unplanned office visit, urgent care, ED, or hospital admission in the last 4 weeks? No   How does patient/caregiver feel medication is working? Good   Financial problems or insurance changes? No   How many doses of your specialty medications were missed in the last 4 weeks? 0   Would patient like to speak to a pharmacist? No   When does the patient need to receive the medication? 08/30/23   Refill Delivery Questions    How will the patient receive the medication? Mail   When does the patient need to receive the medication? 08/30/23   Shipping Address Home   Address in [facility] confirmed and updated if neccessary? Yes   Expected Copay ($) 0   Is the patient able to afford the medication copay? Yes   Payment Method zero copay   Days supply of Refill 28   Supplies needed? No supplies needed   Refill activity completed? Yes   Refill activity plan Refill scheduled   Shipment/Pickup Date: 08/29/23            Current Outpatient Medications   Medication Sig    albuterol (PROVENTIL/VENTOLIN HFA) 90 mcg/actuation inhaler as needed.    albuterol-ipratropium (DUO-NEB) 2.5 mg-0.5 mg/3 mL nebulizer solution ipratropium 0.5 mg-albuterol 3 mg (2.5 mg base)/3 mL nebulization soln   USE 1 AMPULE IN NEBULIZER EVERY 8 TO 12 HOURS AS NEEDED FOR COUGH    alendronate (FOSAMAX) 70 MG tablet Take 1 tablet (70 mg total)  by mouth every 7 days.    ammonium lactate (LAC-HYDRIN) 12 % lotion Apply topically 2 (two) times daily.    aspirin 81 MG Chew aspirin 81 mg chewable tablet   Chew 1 tablet every day by oral route for 30 days.    atorvastatin (LIPITOR) 40 MG tablet Take 40 mg by mouth once daily.    calcium-vitamin D 600 mg(1,500mg) -400 unit Tab Take by mouth once daily.    EPINEPHrine (EPIPEN) 0.3 mg/0.3 mL AtIn epinephrine 0.3 mg/0.3 mL injection, auto-injector   use AS DIRECTED    folic acid (FOLVITE) 1 MG tablet Take 1 tablet (1 mg total) by mouth once daily.    ibuprofen (ADVIL,MOTRIN) 800 MG tablet ibuprofen 800 mg tablet   TAKE ONE TABLET BY MOUTH DAILY AS NEEDED FOR PAIN    methotrexate 2.5 MG Tab Take 4 tablets (10 mg total) by mouth every 7 days.    metoprolol succinate (TOPROL-XL) 25 MG 24 hr tablet Take 25 mg by mouth.    metoprolol tartrate (LOPRESSOR) 25 MG tablet Take 12.5 mg by mouth 2 (two) times daily.    multivitamin capsule Take 1 capsule by mouth once daily.    pantoprazole (PROTONIX) 40 MG tablet Take 1 tablet (40 mg total) by mouth once daily.    predniSONE (DELTASONE) 5 MG tablet Take 1 tablet (5 mg total) by mouth once daily.    tocilizumab (ACTEMRA) 162 mg/0.9 mL injection Inject 0.9 mLs (162 mg total) into the skin every 14 (fourteen) days.   Last reviewed on 8/16/2023  9:50 AM by Katiuska Grace MA    Review of patient's allergies indicates:   Allergen Reactions    Venom-wasp Anaphylaxis    Last reviewed on  8/28/2023 2:38 PM by Negrito Adkins      Tasks added this encounter   No tasks added.   Tasks due within next 3 months   No tasks due.     Fidel Silva, PharmD  [facility]  [address] [place] [address]  Phone: [phone]  Fax: [phone]

## 2024-01-29 ENCOUNTER — PATIENT MESSAGE (OUTPATIENT)
Dept: ADMINISTRATIVE | Facility: OTHER | Age: 64
End: 2024-01-29